# Patient Record
Sex: FEMALE | Race: WHITE | Employment: OTHER | URBAN - METROPOLITAN AREA
[De-identification: names, ages, dates, MRNs, and addresses within clinical notes are randomized per-mention and may not be internally consistent; named-entity substitution may affect disease eponyms.]

---

## 2019-07-31 ENCOUNTER — APPOINTMENT (OUTPATIENT)
Dept: GENERAL RADIOLOGY | Age: 44
End: 2019-07-31
Payer: COMMERCIAL

## 2019-07-31 ENCOUNTER — HOSPITAL ENCOUNTER (EMERGENCY)
Age: 44
Discharge: HOME | End: 2019-07-31
Attending: EMERGENCY MEDICINE
Payer: COMMERCIAL

## 2019-07-31 ENCOUNTER — APPOINTMENT (OUTPATIENT)
Dept: CT IMAGING | Age: 44
End: 2019-07-31
Payer: COMMERCIAL

## 2019-07-31 VITALS
SYSTOLIC BLOOD PRESSURE: 138 MMHG | TEMPERATURE: 97.5 F | HEART RATE: 121 BPM | DIASTOLIC BLOOD PRESSURE: 90 MMHG | OXYGEN SATURATION: 97 % | RESPIRATION RATE: 18 BRPM

## 2019-07-31 DIAGNOSIS — G89.29 CHRONIC NECK PAIN: ICD-10-CM

## 2019-07-31 DIAGNOSIS — J18.9 PNEUMONIA DUE TO ORGANISM: Primary | ICD-10-CM

## 2019-07-31 DIAGNOSIS — M54.2 CHRONIC NECK PAIN: ICD-10-CM

## 2019-07-31 LAB
ANION GAP SERPL CALCULATED.3IONS-SCNC: 14 MMOL/L (ref 3–16)
BACTERIA: ABNORMAL /HPF
BASOPHILS ABSOLUTE: 0.1 K/UL (ref 0–0.2)
BASOPHILS RELATIVE PERCENT: 0.7 %
BILIRUBIN URINE: NEGATIVE
BLOOD, URINE: ABNORMAL
BUN BLDV-MCNC: 6 MG/DL (ref 7–20)
CALCIUM SERPL-MCNC: 8.8 MG/DL (ref 8.3–10.6)
CHLORIDE BLD-SCNC: 99 MMOL/L (ref 99–110)
CLARITY: CLEAR
CO2: 19 MMOL/L (ref 21–32)
COLOR: YELLOW
CREAT SERPL-MCNC: 0.5 MG/DL (ref 0.6–1.1)
D DIMER: 472 NG/ML DDU (ref 0–229)
EKG ATRIAL RATE: 143 BPM
EKG DIAGNOSIS: NORMAL
EKG P AXIS: -7 DEGREES
EKG P-R INTERVAL: 88 MS
EKG Q-T INTERVAL: 364 MS
EKG QRS DURATION: 84 MS
EKG QTC CALCULATION (BAZETT): 561 MS
EKG R AXIS: -40 DEGREES
EKG T AXIS: 4 DEGREES
EKG VENTRICULAR RATE: 143 BPM
EOSINOPHILS ABSOLUTE: 0 K/UL (ref 0–0.6)
EOSINOPHILS RELATIVE PERCENT: 0.1 %
EPITHELIAL CELLS, UA: ABNORMAL /HPF
GFR AFRICAN AMERICAN: >60
GFR NON-AFRICAN AMERICAN: >60
GLUCOSE BLD-MCNC: 129 MG/DL (ref 70–99)
GLUCOSE URINE: NEGATIVE MG/DL
HCG(URINE) PREGNANCY TEST: NEGATIVE
HCT VFR BLD CALC: 39.1 % (ref 36–48)
HEMOGLOBIN: 13.1 G/DL (ref 12–16)
KETONES, URINE: NEGATIVE MG/DL
LEUKOCYTE ESTERASE, URINE: NEGATIVE
LYMPHOCYTES ABSOLUTE: 1.2 K/UL (ref 1–5.1)
LYMPHOCYTES RELATIVE PERCENT: 11.9 %
MCH RBC QN AUTO: 29.3 PG (ref 26–34)
MCHC RBC AUTO-ENTMCNC: 33.6 G/DL (ref 31–36)
MCV RBC AUTO: 87.4 FL (ref 80–100)
MICROSCOPIC EXAMINATION: YES
MONOCYTES ABSOLUTE: 0.8 K/UL (ref 0–1.3)
MONOCYTES RELATIVE PERCENT: 8 %
NEUTROPHILS ABSOLUTE: 7.7 K/UL (ref 1.7–7.7)
NEUTROPHILS RELATIVE PERCENT: 79.3 %
NITRITE, URINE: NEGATIVE
PDW BLD-RTO: 13 % (ref 12.4–15.4)
PH UA: 6 (ref 5–8)
PLATELET # BLD: 264 K/UL (ref 135–450)
PMV BLD AUTO: 9.4 FL (ref 5–10.5)
POTASSIUM REFLEX MAGNESIUM: 4.6 MMOL/L (ref 3.5–5.1)
PROTEIN UA: ABNORMAL MG/DL
RBC # BLD: 4.47 M/UL (ref 4–5.2)
RBC UA: ABNORMAL /HPF (ref 0–2)
SODIUM BLD-SCNC: 132 MMOL/L (ref 136–145)
SPECIFIC GRAVITY UA: 1.02 (ref 1–1.03)
URINE TYPE: ABNORMAL
UROBILINOGEN, URINE: 0.2 E.U./DL
WBC # BLD: 9.8 K/UL (ref 4–11)
WBC UA: ABNORMAL /HPF (ref 0–5)

## 2019-07-31 PROCEDURE — 71046 X-RAY EXAM CHEST 2 VIEWS: CPT

## 2019-07-31 PROCEDURE — 99285 EMERGENCY DEPT VISIT HI MDM: CPT

## 2019-07-31 PROCEDURE — 71275 CT ANGIOGRAPHY CHEST: CPT

## 2019-07-31 PROCEDURE — 85025 COMPLETE CBC W/AUTO DIFF WBC: CPT

## 2019-07-31 PROCEDURE — 85379 FIBRIN DEGRADATION QUANT: CPT

## 2019-07-31 PROCEDURE — 6360000004 HC RX CONTRAST MEDICATION: Performed by: EMERGENCY MEDICINE

## 2019-07-31 PROCEDURE — 96360 HYDRATION IV INFUSION INIT: CPT

## 2019-07-31 PROCEDURE — 84703 CHORIONIC GONADOTROPIN ASSAY: CPT

## 2019-07-31 PROCEDURE — 36415 COLL VENOUS BLD VENIPUNCTURE: CPT

## 2019-07-31 PROCEDURE — 81001 URINALYSIS AUTO W/SCOPE: CPT

## 2019-07-31 PROCEDURE — 2580000003 HC RX 258: Performed by: PHYSICIAN ASSISTANT

## 2019-07-31 PROCEDURE — 80048 BASIC METABOLIC PNL TOTAL CA: CPT

## 2019-07-31 PROCEDURE — 93005 ELECTROCARDIOGRAM TRACING: CPT | Performed by: EMERGENCY MEDICINE

## 2019-07-31 RX ORDER — LEVOFLOXACIN 750 MG/1
750 TABLET ORAL DAILY
Qty: 5 TABLET | Refills: 0 | Status: SHIPPED | OUTPATIENT
Start: 2019-07-31 | End: 2019-08-05

## 2019-07-31 RX ORDER — SODIUM CHLORIDE, SODIUM LACTATE, POTASSIUM CHLORIDE, CALCIUM CHLORIDE 600; 310; 30; 20 MG/100ML; MG/100ML; MG/100ML; MG/100ML
1000 INJECTION, SOLUTION INTRAVENOUS ONCE
Status: COMPLETED | OUTPATIENT
Start: 2019-07-31 | End: 2019-07-31

## 2019-07-31 RX ADMIN — SODIUM CHLORIDE, POTASSIUM CHLORIDE, SODIUM LACTATE AND CALCIUM CHLORIDE 1000 ML: 600; 310; 30; 20 INJECTION, SOLUTION INTRAVENOUS at 18:30

## 2019-07-31 RX ADMIN — IOPAMIDOL 80 ML: 755 INJECTION, SOLUTION INTRAVENOUS at 18:15

## 2019-07-31 ASSESSMENT — PAIN DESCRIPTION - PAIN TYPE: TYPE: ACUTE PAIN

## 2019-07-31 ASSESSMENT — PAIN SCALES - GENERAL: PAINLEVEL_OUTOF10: 7

## 2019-07-31 ASSESSMENT — PAIN DESCRIPTION - DESCRIPTORS: DESCRIPTORS: CONSTANT

## 2019-07-31 ASSESSMENT — PAIN DESCRIPTION - LOCATION: LOCATION: HEAD;NECK

## 2022-02-25 ENCOUNTER — HOSPITAL ENCOUNTER (EMERGENCY)
Age: 47
Discharge: HOME | End: 2022-02-25
Attending: STUDENT IN AN ORGANIZED HEALTH CARE EDUCATION/TRAINING PROGRAM
Payer: COMMERCIAL

## 2022-02-25 VITALS
TEMPERATURE: 98.9 F | RESPIRATION RATE: 18 BRPM | OXYGEN SATURATION: 99 % | HEIGHT: 64 IN | WEIGHT: 160 LBS | SYSTOLIC BLOOD PRESSURE: 134 MMHG | BODY MASS INDEX: 27.31 KG/M2 | HEART RATE: 89 BPM | DIASTOLIC BLOOD PRESSURE: 82 MMHG

## 2022-02-25 DIAGNOSIS — M54.2 CHRONIC NECK PAIN: Primary | ICD-10-CM

## 2022-02-25 DIAGNOSIS — G89.29 CHRONIC NECK PAIN: Primary | ICD-10-CM

## 2022-02-25 PROBLEM — T85.615A: Status: ACTIVE | Noted: 2020-11-19

## 2022-02-25 PROBLEM — N93.9 ABNORMAL UTERINE BLEEDING (AUB): Status: ACTIVE | Noted: 2017-04-06

## 2022-02-25 PROBLEM — E66.3 OVERWEIGHT: Status: ACTIVE | Noted: 2020-11-06

## 2022-02-25 PROCEDURE — 6360000002 HC RX W HCPCS: Performed by: PHYSICIAN ASSISTANT

## 2022-02-25 PROCEDURE — 6360000002 HC RX W HCPCS: Performed by: STUDENT IN AN ORGANIZED HEALTH CARE EDUCATION/TRAINING PROGRAM

## 2022-02-25 PROCEDURE — 6370000000 HC RX 637 (ALT 250 FOR IP): Performed by: PHYSICIAN ASSISTANT

## 2022-02-25 PROCEDURE — 96376 TX/PRO/DX INJ SAME DRUG ADON: CPT

## 2022-02-25 PROCEDURE — 96374 THER/PROPH/DIAG INJ IV PUSH: CPT

## 2022-02-25 PROCEDURE — 99284 EMERGENCY DEPT VISIT MOD MDM: CPT

## 2022-02-25 RX ORDER — CYCLOBENZAPRINE HCL 10 MG
10 TABLET ORAL ONCE
Status: COMPLETED | OUTPATIENT
Start: 2022-02-25 | End: 2022-02-25

## 2022-02-25 RX ORDER — CYCLOBENZAPRINE HCL 10 MG
TABLET ORAL
COMMUNITY
Start: 2021-12-07

## 2022-02-25 RX ORDER — OXYCODONE AND ACETAMINOPHEN 10; 325 MG/1; MG/1
1 TABLET ORAL EVERY 4 HOURS PRN
COMMUNITY

## 2022-02-25 RX ORDER — KETOROLAC TROMETHAMINE 30 MG/ML
15 INJECTION, SOLUTION INTRAMUSCULAR; INTRAVENOUS ONCE
Status: DISCONTINUED | OUTPATIENT
Start: 2022-02-25 | End: 2022-02-25

## 2022-02-25 RX ORDER — BISOPROLOL FUMARATE 10 MG/1
TABLET ORAL
COMMUNITY
Start: 2022-02-22

## 2022-02-25 RX ORDER — LIDOCAINE 4 G/G
1 PATCH TOPICAL DAILY
Status: DISCONTINUED | OUTPATIENT
Start: 2022-02-25 | End: 2022-02-25 | Stop reason: HOSPADM

## 2022-02-25 RX ORDER — OXYCODONE HYDROCHLORIDE AND ACETAMINOPHEN 5; 325 MG/1; MG/1
2 TABLET ORAL ONCE
Status: DISCONTINUED | OUTPATIENT
Start: 2022-02-25 | End: 2022-02-25

## 2022-02-25 RX ADMIN — CYCLOBENZAPRINE 10 MG: 10 TABLET, FILM COATED ORAL at 21:26

## 2022-02-25 RX ADMIN — HYDROMORPHONE HYDROCHLORIDE 1 MG: 1 INJECTION, SOLUTION INTRAMUSCULAR; INTRAVENOUS; SUBCUTANEOUS at 19:51

## 2022-02-25 RX ADMIN — HYDROMORPHONE HYDROCHLORIDE 1 MG: 1 INJECTION, SOLUTION INTRAMUSCULAR; INTRAVENOUS; SUBCUTANEOUS at 21:26

## 2022-02-25 ASSESSMENT — ENCOUNTER SYMPTOMS
SHORTNESS OF BREATH: 0
CHEST TIGHTNESS: 0
NAUSEA: 0
ABDOMINAL PAIN: 0
EYE PAIN: 0
BACK PAIN: 1
VOMITING: 0
WHEEZING: 0
DIARRHEA: 0
SINUS PAIN: 0
COUGH: 0
SORE THROAT: 0

## 2022-02-25 ASSESSMENT — PAIN - FUNCTIONAL ASSESSMENT: PAIN_FUNCTIONAL_ASSESSMENT: 0-10

## 2022-02-25 ASSESSMENT — PAIN DESCRIPTION - PAIN TYPE: TYPE: CHRONIC PAIN

## 2022-02-25 ASSESSMENT — PAIN SCALES - GENERAL
PAINLEVEL_OUTOF10: 10
PAINLEVEL_OUTOF10: 8

## 2022-02-25 ASSESSMENT — PAIN DESCRIPTION - LOCATION: LOCATION: NECK

## 2022-02-26 ENCOUNTER — HOSPITAL ENCOUNTER (EMERGENCY)
Age: 47
Discharge: HOME | End: 2022-02-27
Attending: STUDENT IN AN ORGANIZED HEALTH CARE EDUCATION/TRAINING PROGRAM
Payer: COMMERCIAL

## 2022-02-26 ENCOUNTER — APPOINTMENT (OUTPATIENT)
Dept: CT IMAGING | Age: 47
End: 2022-02-26
Payer: COMMERCIAL

## 2022-02-26 VITALS
WEIGHT: 166.3 LBS | RESPIRATION RATE: 16 BRPM | DIASTOLIC BLOOD PRESSURE: 88 MMHG | HEART RATE: 91 BPM | OXYGEN SATURATION: 95 % | SYSTOLIC BLOOD PRESSURE: 142 MMHG | TEMPERATURE: 98.4 F | BODY MASS INDEX: 28.39 KG/M2 | HEIGHT: 64 IN

## 2022-02-26 DIAGNOSIS — M54.32 LEFT SCIATIC NERVE PAIN: ICD-10-CM

## 2022-02-26 DIAGNOSIS — M54.6 PAIN IN THORACIC SPINE: ICD-10-CM

## 2022-02-26 DIAGNOSIS — R70.0 ELEVATED ERYTHROCYTE SEDIMENTATION RATE: ICD-10-CM

## 2022-02-26 DIAGNOSIS — R79.82 ELEVATED C-REACTIVE PROTEIN (CRP): ICD-10-CM

## 2022-02-26 DIAGNOSIS — G89.29 CHRONIC CERVICAL PAIN: Primary | ICD-10-CM

## 2022-02-26 DIAGNOSIS — M54.2 CHRONIC CERVICAL PAIN: Primary | ICD-10-CM

## 2022-02-26 LAB
ANION GAP SERPL CALCULATED.3IONS-SCNC: 10 MMOL/L (ref 3–16)
BASOPHILS ABSOLUTE: 0 K/UL (ref 0–0.2)
BASOPHILS RELATIVE PERCENT: 0.6 %
BUN BLDV-MCNC: 9 MG/DL (ref 7–20)
C-REACTIVE PROTEIN: 10.8 MG/L (ref 0–5.1)
CALCIUM SERPL-MCNC: 9.3 MG/DL (ref 8.3–10.6)
CHLORIDE BLD-SCNC: 106 MMOL/L (ref 99–110)
CO2: 24 MMOL/L (ref 21–32)
CREAT SERPL-MCNC: 0.7 MG/DL (ref 0.6–1.1)
EOSINOPHILS ABSOLUTE: 0 K/UL (ref 0–0.6)
EOSINOPHILS RELATIVE PERCENT: 0.4 %
GFR AFRICAN AMERICAN: >60
GFR NON-AFRICAN AMERICAN: >60
GLUCOSE BLD-MCNC: 125 MG/DL (ref 70–99)
HCT VFR BLD CALC: 37.6 % (ref 36–48)
HEMOGLOBIN: 12.8 G/DL (ref 12–16)
LYMPHOCYTES ABSOLUTE: 1.7 K/UL (ref 1–5.1)
LYMPHOCYTES RELATIVE PERCENT: 29.3 %
MCH RBC QN AUTO: 29.8 PG (ref 26–34)
MCHC RBC AUTO-ENTMCNC: 34.1 G/DL (ref 31–36)
MCV RBC AUTO: 87.5 FL (ref 80–100)
MONOCYTES ABSOLUTE: 0.5 K/UL (ref 0–1.3)
MONOCYTES RELATIVE PERCENT: 9 %
NEUTROPHILS ABSOLUTE: 3.6 K/UL (ref 1.7–7.7)
NEUTROPHILS RELATIVE PERCENT: 60.7 %
PDW BLD-RTO: 12.7 % (ref 12.4–15.4)
PLATELET # BLD: 309 K/UL (ref 135–450)
PMV BLD AUTO: 9.4 FL (ref 5–10.5)
POTASSIUM SERPL-SCNC: 4.2 MMOL/L (ref 3.5–5.1)
RBC # BLD: 4.3 M/UL (ref 4–5.2)
SEDIMENTATION RATE, ERYTHROCYTE: 32 MM/HR (ref 0–20)
SODIUM BLD-SCNC: 140 MMOL/L (ref 136–145)
WBC # BLD: 5.9 K/UL (ref 4–11)

## 2022-02-26 PROCEDURE — 6360000002 HC RX W HCPCS: Performed by: STUDENT IN AN ORGANIZED HEALTH CARE EDUCATION/TRAINING PROGRAM

## 2022-02-26 PROCEDURE — 96374 THER/PROPH/DIAG INJ IV PUSH: CPT

## 2022-02-26 PROCEDURE — 85652 RBC SED RATE AUTOMATED: CPT

## 2022-02-26 PROCEDURE — 85025 COMPLETE CBC W/AUTO DIFF WBC: CPT

## 2022-02-26 PROCEDURE — 36415 COLL VENOUS BLD VENIPUNCTURE: CPT

## 2022-02-26 PROCEDURE — 96375 TX/PRO/DX INJ NEW DRUG ADDON: CPT

## 2022-02-26 PROCEDURE — 72128 CT CHEST SPINE W/O DYE: CPT

## 2022-02-26 PROCEDURE — 86140 C-REACTIVE PROTEIN: CPT

## 2022-02-26 PROCEDURE — 72131 CT LUMBAR SPINE W/O DYE: CPT

## 2022-02-26 PROCEDURE — 96376 TX/PRO/DX INJ SAME DRUG ADON: CPT

## 2022-02-26 PROCEDURE — 72125 CT NECK SPINE W/O DYE: CPT

## 2022-02-26 PROCEDURE — 99283 EMERGENCY DEPT VISIT LOW MDM: CPT

## 2022-02-26 PROCEDURE — 80048 BASIC METABOLIC PNL TOTAL CA: CPT

## 2022-02-26 RX ADMIN — HYDROMORPHONE HYDROCHLORIDE 1 MG: 1 INJECTION, SOLUTION INTRAMUSCULAR; INTRAVENOUS; SUBCUTANEOUS at 23:47

## 2022-02-26 RX ADMIN — HYDROMORPHONE HYDROCHLORIDE 1 MG: 1 INJECTION, SOLUTION INTRAMUSCULAR; INTRAVENOUS; SUBCUTANEOUS at 21:05

## 2022-02-26 ASSESSMENT — PAIN SCALES - GENERAL
PAINLEVEL_OUTOF10: 9
PAINLEVEL_OUTOF10: 10
PAINLEVEL_OUTOF10: 8

## 2022-02-26 ASSESSMENT — PAIN DESCRIPTION - DESCRIPTORS: DESCRIPTORS: ACHING

## 2022-02-26 ASSESSMENT — PAIN DESCRIPTION - LOCATION: LOCATION: BACK;NECK

## 2022-02-26 ASSESSMENT — PAIN DESCRIPTION - PAIN TYPE: TYPE: CHRONIC PAIN;ACUTE PAIN

## 2022-02-26 ASSESSMENT — PAIN DESCRIPTION - FREQUENCY: FREQUENCY: CONTINUOUS

## 2022-02-26 ASSESSMENT — PAIN - FUNCTIONAL ASSESSMENT: PAIN_FUNCTIONAL_ASSESSMENT: 0-10

## 2022-02-26 NOTE — ED PROVIDER NOTES
[address] [facility] [address] ENCOUNTER          PHYSICIAN ASSISTANT NOTE       Date of evaluation: 2/25/2022    Chief Complaint     Neck Pain (states chronic and has dilaudid pump)    History of Present Illness     Alie Dumas is a 51 y.o. female who presents with a past medical history of degenerative disc disease and multiple back surgeries including cervical fusion who presents today with neck pain. Patient states that she has had chronic neck pain and currently has a Dilaudid pump as well as takes oral Percocet. Patient states that she has had this Dilaudid pump since November 2020. Over the past few weeks she has felt like her pump has not been working or controlling her pain as well as she used to. Last night, the patient had an episode where she was feeling very warm, sweaty and then had chills with reported subjective fevers and so she contacted her anesthesiologist who had the patient get some labs done this morning. Patient states that she knows that there is 7 cc left in her pain pump but cannot state exactly how much gets dispersed at what time. She has been in contact with both her anesthesiologist as well as the representative for the pain pump. Anesthesiologist sent the patient to the emergency department for control of her pain. They do have outpatient follow-up scheduled in the near future. Patient denies taking her temperature at home. She denies any chest pain, shortness of breath, nausea, vomiting, diarrhea, abdominal pain, numbness or tingling of the extremities, weakness. Patient denies any neck meningismus. Patient has not had a period in quite some time due to oral birth control pills and is not concerned for pregnancy. Denies any sick contacts. She is vaccinated for COVID-19. Review of Systems     Review of Systems   Constitutional: Positive for chills. HENT: Negative for sinus pain, sneezing and sore throat. Eyes: Negative for pain. Extraocular Movements: Extraocular movements intact. Pupils: Pupils are equal, round, and reactive to light. Cardiovascular:      Rate and Rhythm: Normal rate and regular rhythm. Pulmonary:      Effort: Pulmonary effort is normal.      Breath sounds: Normal breath sounds. Abdominal:      General: Abdomen is flat. Bowel sounds are normal. There is no distension. Palpations: Abdomen is soft. There is no mass. Tenderness: There is no abdominal tenderness. There is no guarding. Musculoskeletal:         General: Tenderness and signs of injury present. Normal range of motion. Cervical back: Normal range of motion and neck supple. No rigidity or tenderness. Comments: There is a scar located in the cervicothoracic region of the spine consistent with patient's previous surgical interventions. There is tenderness over this region. Patient has good range of motion of the spine. Good strength of the upper and lower extremities bilaterally. Good sensation of the upper and lower extremities bilaterally. Gait is steady. Lymphadenopathy:      Cervical: No cervical adenopathy. Skin:     General: Skin is warm and dry. Capillary Refill: Capillary refill takes less than 2 seconds. Neurological:      General: No focal deficit present. Mental Status: She is alert and oriented to person, place, and time. Mental status is at baseline. Psychiatric:         Mood and Affect: Mood normal.         Behavior: Behavior normal.         Thought Content: Thought content normal.         Judgment: Judgment normal.       Diagnostic Results     RADIOLOGY:  No orders to display     LABS:   No results found for this visit on 02/25/22. RECENT VITALS:  BP: (!) 144/96, Temp: 98.9 °F (37.2 °C), Pulse: 89, Resp: 18, SpO2: 100 %     Procedures     None    ED Course     Nursing Notes, Past Medical Hx,Past Surgical Hx, Social Hx, Allergies, and Family Hx were reviewed.     The patient was given the following medications:  Orders Placed This Encounter   Medications    HYDROmorphone (DILAUDID) injection 1 mg    cyclobenzaprine (FLEXERIL) tablet 10 mg    DISCONTD: oxyCODONE-acetaminophen (PERCOCET) 5-325 MG per tablet 2 tablet    ketorolac (TORADOL) injection 15 mg    lidocaine 4 % external patch 1 patch    HYDROmorphone (DILAUDID) injection 1 mg     CONSULTS:  None    MEDICAL DECISION MAKING / ASSESSMENT / Chioma Jacklyn is a 51 y.o. female who presents with acute on chronic neck pain with a history of degenerative disc disease status post multiple cervical fusions. On my exam the patient is very well-appearing she is afebrile, not tachycardic, tachypneic or hypoxic. She is normotensive. Patient initially given a dose of IV Dilaudid with some relief in her symptoms. She still complains of some restless legs and 8/10 pain. So at this point I decided to give the patient her home medications which is a dose of Percocet , Flexeril 10 mg and a dose of Toradol with Lidoderm patch for multimodal control of the patient's pain. The patient has no evidence of any meningismus, fevers. She had a normal white blood cell count of CBC this morning. My suspicion for meningitis is very low at this point. The patient denies urinary incontinence, urinary retention, and numbness in the extremities. The patient is neurologically intact, sensation is intact in the lower extremities, dorsalis pedal pulses 2+ bilaterally, and achilles reflex intact bilaterally. I do not feel imaging is necessary at this point based on pain is chronic in nature in the absence of trauma, progressive neurological findings, history of malignancy, age >/= 48years old, unexpected weight loss or fever, infectious risk such as injection drug use, immunosuppression, indwelling urinary catheter, prolonged steroid use, skin or urinary tract infection, osteoporosis. There is no NEW complaints.  There are no subjective complaints nor objective physical examination findings to suggest a referred vascular, genitourinary, intra-abdominal nor gastrointestinal etiology. Attempted to speak with the patient's anesthesiologist who monitors the patient and her pain pump to touch base with him and ask if there is anything he would like us to do on our end however he is currently on a flight. Had a discussion with the patient on gathering labs, imaging or swabs and the patient declined the need for this at this time. She states that she is here specifically for pain control to get her through the night so that she can have follow-up with her physicians in the next week. Patient was given her home medications and pain decreased to about a 7/10 which is according to the patient what she lives at on a daily basis. Therefore I feel that she is appropriate for discharge. Patient and her  are agreeable to this. She will follow up with her anesthesiologist/pain management doctor, Dr. Donte Berg early next week. Patient stated that she was ready to be discharged before I was able to contact the patient's doctor. I feel that this is reasonable as the patient can likely manage her discomfort at home with her oral Percocet, Dilaudid pump, Flexeril. She knows that she can return to the emergency department if her pain becomes uncontrollable again. This patient was also evaluated by the attending physician. All care plans were discussed and agreed upon. Clinical Impression     1.  Chronic neck pain      Disposition     PATIENT REFERRED TO:  Bhakti Cabral  [address] [place] [address] Rd  [phone]    Schedule an appointment as soon as possible for a visit   As needed    [facility] Emergency Department  [address] [place] [address]  Go to   As needed, If symptoms worsen      DISCHARGE MEDICATIONS:  New Prescriptions    No medications on file     DISPOSITION [address] [facility] [address]     [place], [place]  02/25/22 4202

## 2022-02-27 PROCEDURE — 99283 EMERGENCY DEPT VISIT LOW MDM: CPT

## 2022-02-27 PROCEDURE — 6360000002 HC RX W HCPCS: Performed by: STUDENT IN AN ORGANIZED HEALTH CARE EDUCATION/TRAINING PROGRAM

## 2022-02-27 RX ORDER — METHYLPREDNISOLONE 4 MG/1
TABLET ORAL
Qty: 1 KIT | Refills: 0 | Status: SHIPPED | OUTPATIENT
Start: 2022-02-27 | End: 2022-03-05

## 2022-02-27 RX ORDER — DEXAMETHASONE SODIUM PHOSPHATE 4 MG/ML
10 INJECTION, SOLUTION INTRA-ARTICULAR; INTRALESIONAL; INTRAMUSCULAR; INTRAVENOUS; SOFT TISSUE ONCE
Status: COMPLETED | OUTPATIENT
Start: 2022-02-27 | End: 2022-02-27

## 2022-02-27 RX ADMIN — DEXAMETHASONE SODIUM PHOSPHATE 10 MG: 4 INJECTION, SOLUTION INTRAMUSCULAR; INTRAVENOUS at 00:44

## 2022-02-27 NOTE — ED PROVIDER NOTES
[id number]          ATTENDING PHYSICIAN NOTE       Date of evaluation: 2/26/2022    Chief Complaint     Chief Complaint   Patient presents with    Neck Pain     increased neck pain since pain pump was adjusted on thursday          History of Present Illness     HPI     Kiran Ellis is a 51 y.o. female with a past medical history significant for degenerative disc disease of cervical and lumbar spine with multiple back surgeries including 3 different cervical fusions, lumbar fusion, who has had a implanted pain pump since 2020, with subsequent removal and replacement in late 2021, who presents to the ED today for repeat evaluation of recurrent acute on chronic exacerbation of her cervical pain, in addition to some thoracic pain today. Of note, I saw this patient yesterday evening/last night in this ED for acute on chronic cervical pain after having the patency of her pain pump catheter evaluated in her pain management physician's office the day before, which included just removal of her wound 1 to 2 cc of CSF and plain film imaging with fluoroscopy. When she presented to this ED yesterday evening she reported 10/10 cervical pain, with a reported baseline day to day \"normal\" pain of 7/10. She had been in discussion with her pain management doctor throughout the day yesterday regarding the severity of her pain and the concern for evaluating her for meningitis was brought it, leading her to present to our ED. of note, she had already had some blood work drawn earlier in the day yesterday, which included a CBC with a white blood cell count of around 6. When she presented to our ED then she was afebrile, not tachycardic, not diaphoretic, and appeared generally well, with full (relative) range of motion and no meningeal signs.  Through extended shared decision-making discussion at the bedside with her and her  we elected for just intermittent pain control with a goal of getting back to her baseline pain of 7/10, which we were able to do with IV Dilaudid, which was extensively discussed that this was only temporary and the additional Dilaudid that she received by us via IV would wear off. Note that her intrathecal pain pump supplies Dilaudid, and on top of that she has 10 mg Percocet that she can take every 4-6 hours. We had discussed contingency plans for the weekend, regarding using her pain pump for the intermittent rescue pain dosing that is available with the patient pressing and can be given every 4-6 hours I believe. We also discussed temporarily taking an extra dose of her Percocet or decreasing the time between doses just over the weekend until she can get back in with her pain management doctor on Monday. She notes that he is currently on an airplane headed out of town for the weekend, though they have been in constant communication throughout the day. After leaving the emergency department late last night, once home she took her 10 mg Flexeril, in addition to her prebedtime dose of oxycodone 10 mg. She states that she slept well actually, though woke up maybe twice during the night, which she says is fairly normal for her. She notes though that sometime around 5 or 6 AM this morning her pain was back up to an 8 or 9, and that when she went to give herself an extra \"rescue dose\" of the Dilaudid from her pain pump, she says that it showed that she was unable to have another dose for about 10 or 11 hours, given the amount that she had received within the last 24 hours. She says that she tried to tolerate the pain at home, but that it became just too much and led to her returning to the ED today. Again, she denies any chills, fever, nausea, vomiting, diarrhea. She has a good appetite she is urinating without difficulty without any visual abnormalities of the urine and without dysuria or hematuria.  She has been back in touch with her pain management doctor today as well, and ultimately decided to return to the ED. Here in the the ED she is reporting her pain is about a 9/10., Though she is also describing her pain a little different today than it was yesterday. She is now reporting that the pain is going down from her lower cervical spine to an area of greater tenderness along the mid back between her shoulder blades, then states that she has pain that goes down the left side of her spine, down the back of her left buttocks and all the way down the bottom of her left leg. She denies any falls since her visit last night, denies any heavy lifting. She denies any urinary or fecal incontinence and no numbness or tingling in her groin or in any extremity. Review of Systems     Review of Systems  All other ROS negative except as indicated above in HPI. Past Medical, Surgical, Family, and Social History     Past Medical History:   Diagnosis Date    Chronic cervical pain     3 prior cervical fusions    DDD (degenerative disc disease), cervical     DDD (degenerative disc disease), cervical     DDD (degenerative disc disease), lumbar     GERD (gastroesophageal reflux disease)     HTN (hypertension)        Past Surgical History:   Procedure Laterality Date    BACK SURGERY      CATHETER INSERTION  05/10/2020    Intrathecal pain pump & catheter insertion. Merlin Gavia, MD, done at [address]  x3 (2010, 2013, 2014)    PAIN MANAGEMENT PROCEDURE  11/19/2020    Spinal Pain Pump Replacement. Dr. Tj Johns        History reviewed. No pertinent family history. Social History     Tobacco Use    Smoking status: Former Smoker    Smokeless tobacco: Never Used   Substance Use Topics    Alcohol use: Yes     Comment: social    Drug use: Never          Medications     Prior to Admission medications    Medication Sig Start Date End Date Taking?  Authorizing Provider   methylPREDNISolone (MEDROL, ELIO,) 4 MG tablet Take by mouth. 2/27/22 3/5/22 Yes Elva Thornton MD   norgestrel-ethinyl estradiol (LO/OVRAL) 0.3-30 MG-MCG per tablet takse pills continuously 2/21/22  Yes Historical Provider, MD   bisoprolol (ZEBETA) 10 MG tablet 1 PO Q AM AND 2 PO Q PM  Indications: TACHYCARDIA 2/22/22  Yes Historical Provider, MD   cyclobenzaprine (FLEXERIL) 10 MG tablet TAKE 1 TABLET BY MOUTH 3 (THREE) TIMES DAILY AS NEEDED FOR UP TO 30 DAYS. 12/7/21  Yes Historical Provider, MD   oxyCODONE-acetaminophen (PERCOCET)  MG per tablet Take 1 tablet by mouth every 4 hours as needed. Yes Historical Provider, MD       Allergies     Allergies as of 02/26/2022 - Fully Reviewed 02/26/2022   Allergen Reaction Noted    Pcn [penicillins]  12/15/2014    Norco [hydrocodone-acetaminophen] Rash 07/31/2019    Phenergan [promethazine hcl] Palpitations 07/31/2019        Physical Exam     ED Triage Vitals [02/26/22 2000]   Enc Vitals Group      BP (!) 142/88      Pulse 91      Resp 16      Temp 98.4 °F (36.9 °C)      Temp Source Oral      SpO2 95 %      Weight 166 lb 4.8 oz (75.4 kg)      Height 5' 4\" (1.626 m)     Physical Exam  Vitals and nursing note reviewed. Constitutional:       Appearance: Normal appearance. She is normal weight. She is not ill-appearing. HENT:      Head: Normocephalic and atraumatic. Nose: Nose normal.      Mouth/Throat:      Mouth: Mucous membranes are moist.   Eyes:      Extraocular Movements: Extraocular movements intact. Neck:      Trachea: Trachea normal.   Cardiovascular:      Rate and Rhythm: Normal rate and regular rhythm. Pulses: Normal pulses. Pulmonary:      Effort: Pulmonary effort is normal.   Abdominal:      General: Abdomen is flat. Musculoskeletal:         General: No swelling. Cervical back: Normal range of motion and neck supple. No rigidity. Spinous process tenderness (mild at around C6-C7) and muscular tenderness (left paraspinal) present. No pain with movement.       Thoracic back: Tenderness and bony tenderness present. No swelling, deformity or spasms. Lumbar back: Tenderness present. No bony tenderness. Positive left straight leg raise test. Negative right straight leg raise test.        Back:       Comments: Mild TTP along midline of area around T4-T5 as well as left paraspinal TTP at this level and up to about C6 area, just left of midline     Skin:     General: Skin is warm and dry. Neurological:      Mental Status: She is alert and oriented to person, place, and time. MUSCULOSKELETAL EXAM      SPINE EXAMINATION  INSPECTION: Grossly normal lordosis in the cervical spine, kyphosis in the thoracic spine, lordosis in the lumbar spine. ALIGNMENT: Grossly normal skeletal alignment without scapula or rib cage prominence. PALPATION: As described above    SENSORY EXAMINATION:  C4 (acromioclavicular joint): Intact & symmetric bilat   C5 (lateral deltoid): Intact & symmetric bilat   C6 (thumb): Intact & symmetric bilat   C7 (middle finger): Intact & symmetric bilat   C8 (little finger): Intact & symmetric bilat   T1 (medial arm): Intact & symmetric bilat   L2 (mid anterior thigh): Intact & symmetric bilat   L3 (medial femoral condyle): Intact & symmetric bilat   L4 (medial lower leg & medial malleolus): Intact & symmetric bilat   L5 (lateral lower leg & dorsum of foot): Intact & symmetric bilat   S1 (lateral heel): Intact & symmetric bilat   S2 (medial popliteal fossa): Intact & symmetric bilat    MOTOR EXAMINATION:   C5 (elbowflexion - palm up) Left: 5/5. Right: 5/5  C6 (wrist extension) Left: 5/5. Right: 5/5  C7 (wrist flexion) Left: 5/5. Right: 5/5  C8 (finger flexion) Left: 5/5. Right: 5/5  T1 (hand intrinsics) Left: 5/5. Right: 5/5  L2,3 (hip flexors) Left: 5/5. Right: 5/5  L3,4 (knee extensors) Left: 5/5. Right: 5/5  L4,5 (ankle dorsiflexion) Left: 5/5. Right: 5/5  L5 (EHL) Left: 5/5. Right: 5/5  S1 (ankle plantarflexion): Left: 5/5.  Right: 5/5  S2 (FHL) Left 5/5 Right 5/5    Bilateral patellar reflexes symmetric and no clonus bilaterally    Diagnostic Results     RADIOLOGY:  CT LUMBAR SPINE WO CONTRAST   Final Result      1. Thoracic spine: No acute fracture or deformity. 2. Lumbar spine: Previous fusion at L5-S1 with cage placement. No sign of any spondylolisthesis or fracture. There is an epidural catheter entering underneath the transverse process at L3-4 and extending along the epidural space to T10 or T10-11 region               CT THORACIC SPINE WO CONTRAST   Final Result      1. Thoracic spine: No acute fracture or deformity. 2. Lumbar spine: Previous fusion at L5-S1 with cage placement. No sign of any spondylolisthesis or fracture. There is an epidural catheter entering underneath the transverse process at L3-4 and extending along the epidural space to T10 or T10-11 region               CT CERVICAL SPINE WO CONTRAST   Final Result      1. No acute fracture with mature bony fusion C4-C7   2.  Normal alignment          LABS:   Results for orders placed or performed during the hospital encounter of 02/26/22   Sedimentation Rate   Result Value Ref Range    Sed Rate 32 (H) 0 - 20 mm/Hr   C-Reactive Protein   Result Value Ref Range    CRP 10.8 (H) 0.0 - 5.1 mg/L   CBC with Auto Differential   Result Value Ref Range    WBC 5.9 4.0 - 11.0 K/uL    RBC 4.30 4.00 - 5.20 M/uL    Hemoglobin 12.8 12.0 - 16.0 g/dL    Hematocrit 37.6 36.0 - 48.0 %    MCV 87.5 80.0 - 100.0 fL    MCH 29.8 26.0 - 34.0 pg    MCHC 34.1 31.0 - 36.0 g/dL    RDW 12.7 12.4 - 15.4 %    Platelets 548 462 - 111 K/uL    MPV 9.4 5.0 - 10.5 fL    Neutrophils % 60.7 %    Lymphocytes % 29.3 %    Monocytes % 9.0 %    Eosinophils % 0.4 %    Basophils % 0.6 %    Neutrophils Absolute 3.6 1.7 - 7.7 K/uL    Lymphocytes Absolute 1.7 1.0 - 5.1 K/uL    Monocytes Absolute 0.5 0.0 - 1.3 K/uL    Eosinophils Absolute 0.0 0.0 - 0.6 K/uL    Basophils Absolute 0.0 0.0 - 0.2 K/uL   Basic Metabolic Panel   Result Value Ref Range    Sodium 140 136 - 145 mmol/L Potassium 4.2 3.5 - 5.1 mmol/L    Chloride 106 99 - 110 mmol/L    CO2 24 21 - 32 mmol/L    Anion Gap 10 3 - 16    Glucose 125 (H) 70 - 99 mg/dL    BUN 9 7 - 20 mg/dL    CREATININE 0.7 0.6 - 1.1 mg/dL    GFR Non-African American >60 >60    GFR African American >60 >60    Calcium 9.3 8.3 - 10.6 mg/dL       RECENT VITALS:  BP: (!) 142/88,Temp: 98.4 °F (36.9 °C), Pulse: 91, Resp: 16, SpO2: 95 %     Procedures       Procedures   None    ED Course     I have reviewed and confirmed nursing documentation for the pertinent past medical history, family history, and social history. ED Course as of 02/27/22 0957   Sat Feb 26, 2022 2107 Dr. Esvin Marshall  [phone] [DW]   Yovana Lamp Feb 27, 2022   0000 I spoke with Dr. Esvin Marshall, Anesthesiology with [facility], who is this patient's Pain Management Physician and who she has been going to for 7 years. We talked earlier in this visit about the potential etiologies of her ongoing pain and what modalities to order, etc.   We discussed the CT findings and her inflammatory markers.   [DW]   7923 CT THORACIC SPINE WO CONTRAST [DW]      ED Course User Index  [DW] Rakan Danielle MD       [facility] medications administered in the ED:  ED Medication Orders (From admission, onward)    Start Ordered     Status Ordering Provider    02/27/22 0030 02/27/22 0029  dexamethasone (DECADRON) injection 10 mg  ONCE         Last MAR action: Given - by Tiburcio Jacmoe on 02/27/22 at 0044 [facility] A    02/26/22 2345 02/26/22 2331  HYDROmorphone (DILAUDID) injection 1 mg  ONCE         Last MAR action: Given - by Tiburcio Jacome on 02/26/22 at 2347 [facility]4 CRYSTAL Lebron     02/26/22 2100 02/26/22 2052  HYDROmorphone (DILAUDID) injection 1 mg  ONCE         Last MAR action: Given - by Tiburcio Jacome on 02/26/22 at 2105 [facility] A           CONSULTS:  Yared Newell MD, Anesthesiologypain management - [place] (called directly to Dr. Tereso Rinne cell phone)    MEDICAL DECISIONMAKING / Myesha Weinstein / Ana Maria Hollins Hetal Richardson is a 51 y.o. female with PMHx as above, who presented to the ED today for acute on chronic cervicalgia with addition of thoracic pain in between her shoulder blades and lumbosacral radiculopathy with sciatica. See my HPI above for details of this patient's history and her visit last night. On my initial assessment of her today, that time her pain was 8-9/10, and we discussed in detail how her symptoms are today, reviewed her clinical course after leaving the ED last night, and the pain today. I discussed with her that given this is her second visit in 2 days, and that based on her examination yesterday, already having lab work before coming, and having imaging confirming the patency of at least the tubing of her catheter and the location of the catheter tip, without any infectious symptoms, that there was not a clinical indication to assess further labs or imaging yesterday. Given the return of her pain to a level that is notably above her baseline, along with the addition of thoracic pain and lumbosacral radiculopathy (note she has had pain in these areas before, but not being present yesterday and the return of her pain today back to a level significant enough for her to return to the ED) I called Dr. Hailee Muñoz while at the bedside and we all discussed her pain over these last few days, and this being abnormal for her, we will go ahead and repeat her blood work and obtain CT imaging of the C/T and L spine. While awaiting the results of her lab work and results of her imaging today, she did require 1 additional dose of IV Dilaudid. On reassessment following all of her results coming back, her pain was down to a 7/10 which is her baseline. We went over the results of her blood work, with the CBC and BMP being entirely unremarkable.   Most noteworthy then is that the CT noncontrast imaging of her cervical, thoracic and lumbar spine do not show any evidence of acute fracture, deformity or other acute pathological abnormality. I have reviewed the images and interpretation by reading radiologist. Interestingly, her ESR and CRP were elevated, which I discussed extensively at the bedside with the patient, her , and again we had Dr. Samara Ontiveros on the phone (he has obviously extended himself to wholly be there for Mrs. Mague Mosqueda; he was actually on vacation at the time, out of town and it was late at night) where we went through the collection of her evaluation yesterday, today, 2 sets of normal blood counts, and unremarkable imaging. We also discussed possibilities for her elevated ESR and CRP, which I cannot entirely pinpoint, though we all discussed the very low likelihood of meningitis, spinal abscess, epidural abscess or other abnormal fluid collection in the region but her intrathecal catheter traverses, and we do not see any gross abnormality of such or indicator of such on the plain film CT scans that would necessitate us to repeat the scans with contrast.  Additionally, she did have meningitis or a bacterial fluid collection I would expect her to have some other indicators of acute infectious etiology, such as fever, chills, leukocytosis, meningeal signs, and she has none of these, additionally she has an entirely symmetric and normal neurologic examination and both sensation and motor function. Dr. Samara Ontiveros indicated that the only thing he can think of/suspect given the totality of everything is a problem with the pump. He advised that if she is still requiring more pain meds and having unrelenting pain despite this, we can offer admission with plan for Neurosurgery consult for a pump removal and replacement, inpatient. I discussed this with Mrs. Mague Mosqueda and her , and ultimately it was decided that since her pain is back to 'normal' at this time, that she would be most comfortable at home, sleeping in her bed, which I find entirely understandable and reasonable.   They were advised that should the pain again returned to the level that it was and is intractable to her pain meds that she is able to take at home, that they can always return and that if she does need to return today, the recommendation for the team taking care of her should be to contact and notify Dr. Debbie Poole, and admit with Neurosurgery consult for the above plan. Lastly, given the presence of the elevated inflammatory markers (ESR & CRP) along with her as of yet not clearly identified source of increased pain, I believe a trial of a steroid dosepak is entirely reasonable. She is in agreement with this and therefore at this time I feel she is ok for discharge to home with a clear plan for next steps moving forward. Dr. Debbie Poole will also be back in town Monday and will be able to see her in his clinic/office. She was stable at time of discharge and ambulated in the department independently and without difficulty. I have discussed my assessment and plan with the patient/family, and all questions have been answered to the best of my ability, and to their satisfaction. The patient/family were understanding of this information, and were in agreement with today's assessment and plan. Clinical Impression     1. Chronic cervical pain    2. Pain in thoracic spine    3. Left sciatic nerve pain    4. Elevated erythrocyte sedimentation rate    5. Elevated C-reactive protein (CRP)        Disposition     DISPOSITION:   Decision To Discharge 02/27/2022 12:45:37 AM      PATIENT REFERRED TO:  [address] [place] [address] Rd  [phone]    Call   For Next available ED follow up visit. [address] [place] [address] [address] Derek Moritz 723 [phone]    Call in 1 day  For Next available ED follow up visit.       DISCHARGE MEDICATIONS:  Discharge Medication List as of 2/27/2022 12:36 AM      START taking these medications    Details   methylPREDNISolone (MEDROL, ELIO,) 4 MG tablet Take by mouth., Disp-1 kit, R-0Normal         CONTINUE these medications which have NOT CHANGED    Details   norgestrel-ethinyl estradiol (LO/OVRAL) 0.3-30 MG-MCG per tablet takse pills continuouslyHistorical Med      bisoprolol (ZEBETA) 10 MG tablet 1 PO Q AM AND 2 PO Q PM  Indications: TACHYCARDIAHistorical Med      cyclobenzaprine (FLEXERIL) 10 MG tablet TAKE 1 TABLET BY MOUTH 3 (THREE) TIMES DAILY AS NEEDED FOR UP TO 30 DAYS. Historical Med      oxyCODONE-acetaminophen (PERCOCET)  MG per tablet Take 1 tablet by mouth every 4 hours as needed. Historical Med            Don Davis MD  Emergency Medicine  [facility]     Sarkis Castro MD  02/27/22 2170

## 2022-02-28 ENCOUNTER — HOSPITAL ENCOUNTER (OUTPATIENT)
Age: 47
Setting detail: OBSERVATION
Discharge: HOME | End: 2022-03-02
Attending: EMERGENCY MEDICINE | Admitting: INTERNAL MEDICINE
Payer: COMMERCIAL

## 2022-02-28 DIAGNOSIS — M54.2 NECK PAIN: Primary | ICD-10-CM

## 2022-02-28 DIAGNOSIS — M96.0 PSEUDARTHROSIS AFTER FUSION OR ARTHRODESIS: ICD-10-CM

## 2022-02-28 PROCEDURE — G0378 HOSPITAL OBSERVATION PER HR: HCPCS

## 2022-02-28 PROCEDURE — 99284 EMERGENCY DEPT VISIT MOD MDM: CPT

## 2022-02-28 PROCEDURE — 6370000000 HC RX 637 (ALT 250 FOR IP): Performed by: INTERNAL MEDICINE

## 2022-02-28 PROCEDURE — 96374 THER/PROPH/DIAG INJ IV PUSH: CPT

## 2022-02-28 PROCEDURE — 6360000002 HC RX W HCPCS: Performed by: STUDENT IN AN ORGANIZED HEALTH CARE EDUCATION/TRAINING PROGRAM

## 2022-02-28 PROCEDURE — P9612 CATHETERIZE FOR URINE SPEC: HCPCS

## 2022-02-28 RX ORDER — PROMETHAZINE HYDROCHLORIDE 25 MG/1
12.5 TABLET ORAL EVERY 6 HOURS PRN
Status: DISCONTINUED | OUTPATIENT
Start: 2022-02-28 | End: 2022-03-02 | Stop reason: HOSPADM

## 2022-02-28 RX ORDER — OXYCODONE HYDROCHLORIDE 5 MG/1
5 TABLET ORAL EVERY 4 HOURS PRN
Status: DISCONTINUED | OUTPATIENT
Start: 2022-02-28 | End: 2022-03-01

## 2022-02-28 RX ORDER — ACETAMINOPHEN 325 MG/1
650 TABLET ORAL EVERY 6 HOURS PRN
Status: DISCONTINUED | OUTPATIENT
Start: 2022-02-28 | End: 2022-03-02 | Stop reason: HOSPADM

## 2022-02-28 RX ORDER — OXYCODONE HYDROCHLORIDE 5 MG/1
10 TABLET ORAL EVERY 4 HOURS PRN
Status: COMPLETED | OUTPATIENT
Start: 2022-02-28 | End: 2022-03-01

## 2022-02-28 RX ORDER — POTASSIUM CHLORIDE 7.45 MG/ML
10 INJECTION INTRAVENOUS PRN
Status: DISCONTINUED | OUTPATIENT
Start: 2022-02-28 | End: 2022-03-02 | Stop reason: HOSPADM

## 2022-02-28 RX ORDER — SODIUM CHLORIDE 9 MG/ML
25 INJECTION, SOLUTION INTRAVENOUS PRN
Status: DISCONTINUED | OUTPATIENT
Start: 2022-02-28 | End: 2022-03-02 | Stop reason: HOSPADM

## 2022-02-28 RX ORDER — SODIUM CHLORIDE 0.9 % (FLUSH) 0.9 %
10 SYRINGE (ML) INJECTION PRN
Status: DISCONTINUED | OUTPATIENT
Start: 2022-02-28 | End: 2022-03-02 | Stop reason: HOSPADM

## 2022-02-28 RX ORDER — MAGNESIUM SULFATE IN WATER 40 MG/ML
2000 INJECTION, SOLUTION INTRAVENOUS PRN
Status: DISCONTINUED | OUTPATIENT
Start: 2022-02-28 | End: 2022-03-02 | Stop reason: HOSPADM

## 2022-02-28 RX ORDER — SODIUM CHLORIDE 0.9 % (FLUSH) 0.9 %
10 SYRINGE (ML) INJECTION EVERY 12 HOURS SCHEDULED
Status: DISCONTINUED | OUTPATIENT
Start: 2022-02-28 | End: 2022-03-02 | Stop reason: HOSPADM

## 2022-02-28 RX ORDER — ACETAMINOPHEN 650 MG/1
650 SUPPOSITORY RECTAL EVERY 6 HOURS PRN
Status: DISCONTINUED | OUTPATIENT
Start: 2022-02-28 | End: 2022-03-02 | Stop reason: HOSPADM

## 2022-02-28 RX ORDER — ONDANSETRON 2 MG/ML
4 INJECTION INTRAMUSCULAR; INTRAVENOUS EVERY 6 HOURS PRN
Status: DISCONTINUED | OUTPATIENT
Start: 2022-02-28 | End: 2022-03-02 | Stop reason: HOSPADM

## 2022-02-28 RX ADMIN — HYDROMORPHONE HYDROCHLORIDE 1 MG: 1 INJECTION, SOLUTION INTRAMUSCULAR; INTRAVENOUS; SUBCUTANEOUS at 20:00

## 2022-02-28 RX ADMIN — OXYCODONE 10 MG: 5 TABLET ORAL at 23:50

## 2022-02-28 ASSESSMENT — ENCOUNTER SYMPTOMS
ABDOMINAL PAIN: 0
SHORTNESS OF BREATH: 0
NAUSEA: 0
COUGH: 0
BACK PAIN: 0
DIARRHEA: 0
SORE THROAT: 0
CONSTIPATION: 0
ABDOMINAL DISTENTION: 0
VOMITING: 0
CHEST TIGHTNESS: 0

## 2022-02-28 ASSESSMENT — PAIN SCALES - GENERAL
PAINLEVEL_OUTOF10: 9

## 2022-02-28 ASSESSMENT — PAIN DESCRIPTION - DESCRIPTORS
DESCRIPTORS: SHARP;ACHING
DESCRIPTORS: SHARP

## 2022-02-28 ASSESSMENT — PAIN DESCRIPTION - ONSET
ONSET: GRADUAL
ONSET: GRADUAL

## 2022-02-28 ASSESSMENT — PAIN DESCRIPTION - PROGRESSION
CLINICAL_PROGRESSION: GRADUALLY WORSENING
CLINICAL_PROGRESSION: GRADUALLY WORSENING

## 2022-02-28 ASSESSMENT — PAIN DESCRIPTION - LOCATION
LOCATION: NECK
LOCATION: NECK

## 2022-02-28 ASSESSMENT — PAIN DESCRIPTION - FREQUENCY
FREQUENCY: CONTINUOUS
FREQUENCY: CONTINUOUS

## 2022-02-28 ASSESSMENT — PAIN - FUNCTIONAL ASSESSMENT: PAIN_FUNCTIONAL_ASSESSMENT: 0-10

## 2022-02-28 ASSESSMENT — PAIN DESCRIPTION - PAIN TYPE
TYPE: CHRONIC PAIN
TYPE: ACUTE PAIN;CHRONIC PAIN

## 2022-02-28 NOTE — ED PROVIDER NOTES
[address] [facility] RESIDENT NOTE       Date of evaluation: 2/28/2022    Chief Complaint     Neck Pain (has pain pump in place is not working correctly so having pain in neck here Fri and Sat for same)      of Present Illness     Tito Mcmillan is a 51 y.o. female,  who presents 51 y.o. female with a past medical history significant for degenerative disc disease of cervical and lumbar spine with multiple back surgeries including 3 different cervical fusions (C4-5 level and previous discectomy and bone fusion C5-6 and C6-7 levels.) lumbar fusion, who has had a implanted pain pump since 2020, with subsequent removal and replacement in late 2021, who presents to the ED today for repeat evaluation of recurrent acute on chronic exacerbation of her cervical pain, in addition to some thoracic pain today. Patient has been evaluated for the symptoms twice in the last week. In summary, this top of her discomfort is secondary to a malfunctioning pain pump, and she is required spot dosing of Dilaudid for pain control. On her last visit, on 226/22, 2 days ago, the patient underwent CT imaging of the cervical, thoracic and lumbar spine which showed no acute abnormalities. At that time, the patient was offered admission to be evaluated by neurosurgery for potential replacement of the pain from, however the patient's pain was well managed with the spot doses of Dilaudid in the emergency department and the patient was ultimately discharged home on a Medrol Dosepak. Patient was noted to have mildly elevated ESR of 32 and CRP of 10.8, however with normal white count and lack of other infectious symptoms, meningitis or other infectious etiologies were thought to be less likely.     The patient states that she has continued to have persistent discomfort, however states that the symptoms have not changed in character, prompting her presentation to the emergency department, requesting to be evaluated by neurosurgery for potential pain pump replacement. Currently, she rates her discomfort as 10/10 in severity, with no modifying factors. She describes her discomfort as \"sharp\", in the middle of her lower neck/upper back. She denies any trauma, upper extremity numbness or weakness, headache, vision changes, fever, nausea or vomiting. She has otherwise been in her normal state of health. Review of Systems   A complete review of systems was conducted on this patient and was negative except as noted in the HPI. Review of Systems   Constitutional: Negative for chills and fever. HENT: Negative for sore throat. Respiratory: Negative for cough, chest tightness and shortness of breath. Cardiovascular: Negative for chest pain, palpitations and leg swelling. Gastrointestinal: Negative for abdominal distention, abdominal pain, constipation, diarrhea, nausea and vomiting. Genitourinary: Negative for dysuria, frequency, hematuria and urgency. Musculoskeletal: Positive for neck pain. Negative for back pain, myalgias and neck stiffness. Skin: Negative for rash. Neurological: Negative for weakness, light-headedness and headaches. Hematological: Negative for adenopathy. Psychiatric/Behavioral: The patient is not nervous/anxious. Past Medical, Surgical, Family, and Social History     She has a past medical history of Chronic cervical pain, DDD (degenerative disc disease), cervical, DDD (degenerative disc disease), cervical, DDD (degenerative disc disease), lumbar, GERD (gastroesophageal reflux disease), and HTN (hypertension). She has a past surgical history that includes back surgery; cervical fusion (x3 (2010, 2013, 2014)); Pain management procedure (11/19/2020); and Catheter Insertion (05/10/2020). Her family history is not on file. She reports that she has quit smoking. She has never used smokeless tobacco. She reports current alcohol use.  She reports that she does not use drugs. Medications     Previous Medications    BISOPROLOL (ZEBETA) 10 MG TABLET    1 PO Q AM AND 2 PO Q PM  Indications: TACHYCARDIA    CYCLOBENZAPRINE (FLEXERIL) 10 MG TABLET    TAKE 1 TABLET BY MOUTH 3 (THREE) TIMES DAILY AS NEEDED FOR UP TO 30 DAYS. METHYLPREDNISOLONE (MEDROL, ELIO,) 4 MG TABLET    Take by mouth. NORGESTREL-ETHINYL ESTRADIOL (LO/OVRAL) 0.3-30 MG-MCG PER TABLET    takse pills continuously    OXYCODONE-ACETAMINOPHEN (PERCOCET)  MG PER TABLET    Take 1 tablet by mouth every 4 hours as needed. Allergies     She is allergic to hydrocodone-acetaminophen, penicillins, and phenergan [promethazine hcl]. Physical Exam     INITIAL VITALS: BP: 134/67, Temp: 98 °F (36.7 °C), Pulse: 75, Resp: 16, SpO2: 98 %   Physical Exam  Vitals and nursing note reviewed. Constitutional:       General: She is not in acute distress. Appearance: Normal appearance. She is normal weight. She is not ill-appearing or toxic-appearing. HENT:      Head: Normocephalic and atraumatic. Mouth/Throat:      Mouth: Mucous membranes are moist.   Eyes:      Extraocular Movements: Extraocular movements intact. Conjunctiva/sclera: Conjunctivae normal.   Cardiovascular:      Rate and Rhythm: Normal rate and regular rhythm. Pulses: Normal pulses. Heart sounds: Normal heart sounds. Pulmonary:      Effort: Pulmonary effort is normal.      Breath sounds: Normal breath sounds. Abdominal:      General: There is no distension. Palpations: Abdomen is soft. Tenderness: There is no abdominal tenderness. There is no guarding or rebound. Musculoskeletal:      Cervical back: Neck supple. Tenderness present. No swelling, deformity, rigidity or bony tenderness. Crepitus: diffuse. Decreased range of motion. Thoracic back: Normal. No bony tenderness. Lumbar back: Normal.   Skin:     General: Skin is warm and dry. Capillary Refill: Capillary refill takes less than 2 seconds. Findings: No rash. Neurological:      General: No focal deficit present. Mental Status: She is alert and oriented to person, place, and time. Psychiatric:         Thought Content: Thought content normal.         Judgment: Judgment normal.       DiagnosticResults     RADIOLOGY:  No orders to display       LABS:   No results found for this visit on 02/28/22. RECENT VITALS:  BP: 134/67, Temp: 98 °F (36.7 °C), Pulse: 75,Resp: 16, SpO2: 98 %     ED Course     Nursing Notes, Past Medical Hx, Past Surgical Hx, Social Hx, Allergies, and Family Hx were reviewed. The patient was given the followingmedications:  Orders Placed This Encounter   Medications    HYDROmorphone (DILAUDID) injection 1 mg       CONSULTS:  IP CONSULT TO NEUROSURGERY  IP CONSULT TO HOSPITALIST    MEDICAL DECISION MAKING / ASSESSMENT / Heavenly Loma is a 51 y.o. female who presents 51 y.o. female with a past medical history significant for degenerative disc disease of cervical and lumbar spine with multiple back surgeries including 3 different cervical fusions (C4-5 level and previous discectomy and bone fusion C5-6 and C6-7 levels.) lumbar fusion, who has had a implanted pain pump since 2020, with subsequent removal and replacement in late 2021, who presents to the ED today for repeat evaluation of recurrent acute on chronic exacerbation of her cervical pain, in addition to some thoracic pain today. Given that the patient's symptoms are similar to what she has had in the past, without any new or worsening symptoms or abnormal neurologic exam findings, I do not feel that there is any indication to repeat any labs or perform additional imaging. I discussed the case with Dr. Stewart Freedman, the patient's orthopedic spine surgery, who recommended the patient be admitted for pain control and evaluation by neurosurgery for revision of her epidural pain pump.     The patient was given 1 mg of IV Dilaudid with improvement of her discomfort from 10/10 to 7/10 in severity. I discussed the case with neurosurgery, who agreed to evaluate the patient tomorrow. The patient was admitted to the hospitalist for further pain management overnight. This patient was also evaluated by the attending physician. All care plans were discussed and agreed upon. Clinical Impression     1. Neck pain        Disposition     PATIENT REFERRED TO:  No follow-up provider specified.     DISCHARGE MEDICATIONS:  New Prescriptions    No medications on file       DISPOSITION Decision To Admit 02/28/2022 10:11:30 PM       Julio César Roque MD  Resident  02/28/22 9021

## 2022-03-01 LAB
A/G RATIO: 1.5 (ref 1.1–2.2)
ALBUMIN SERPL-MCNC: 3.8 G/DL (ref 3.4–5)
ALP BLD-CCNC: 45 U/L (ref 40–129)
ALT SERPL-CCNC: 27 U/L (ref 10–40)
ANION GAP SERPL CALCULATED.3IONS-SCNC: 8 MMOL/L (ref 3–16)
AST SERPL-CCNC: 20 U/L (ref 15–37)
BASOPHILS ABSOLUTE: 0 K/UL (ref 0–0.2)
BASOPHILS RELATIVE PERCENT: 0.5 %
BILIRUB SERPL-MCNC: 0.6 MG/DL (ref 0–1)
BUN BLDV-MCNC: 16 MG/DL (ref 7–20)
C-REACTIVE PROTEIN: 5.4 MG/L (ref 0–5.1)
CALCIUM SERPL-MCNC: 8.9 MG/DL (ref 8.3–10.6)
CHLORIDE BLD-SCNC: 105 MMOL/L (ref 99–110)
CO2: 26 MMOL/L (ref 21–32)
CREAT SERPL-MCNC: 0.7 MG/DL (ref 0.6–1.1)
EOSINOPHILS ABSOLUTE: 0 K/UL (ref 0–0.6)
EOSINOPHILS RELATIVE PERCENT: 0.2 %
GFR AFRICAN AMERICAN: >60
GFR NON-AFRICAN AMERICAN: >60
GLUCOSE BLD-MCNC: 81 MG/DL (ref 70–99)
HCT VFR BLD CALC: 35.4 % (ref 36–48)
HEMOGLOBIN: 12 G/DL (ref 12–16)
LYMPHOCYTES ABSOLUTE: 3.3 K/UL (ref 1–5.1)
LYMPHOCYTES RELATIVE PERCENT: 44.7 %
MCH RBC QN AUTO: 29.4 PG (ref 26–34)
MCHC RBC AUTO-ENTMCNC: 33.8 G/DL (ref 31–36)
MCV RBC AUTO: 87 FL (ref 80–100)
MONOCYTES ABSOLUTE: 0.6 K/UL (ref 0–1.3)
MONOCYTES RELATIVE PERCENT: 8.8 %
NEUTROPHILS ABSOLUTE: 3.4 K/UL (ref 1.7–7.7)
NEUTROPHILS RELATIVE PERCENT: 45.8 %
PDW BLD-RTO: 12.7 % (ref 12.4–15.4)
PLATELET # BLD: 272 K/UL (ref 135–450)
PMV BLD AUTO: 9.6 FL (ref 5–10.5)
POTASSIUM REFLEX MAGNESIUM: 4.1 MMOL/L (ref 3.5–5.1)
RBC # BLD: 4.06 M/UL (ref 4–5.2)
SEDIMENTATION RATE, ERYTHROCYTE: 13 MM/HR (ref 0–20)
SODIUM BLD-SCNC: 139 MMOL/L (ref 136–145)
TOTAL PROTEIN: 6.3 G/DL (ref 6.4–8.2)
WBC # BLD: 7.4 K/UL (ref 4–11)

## 2022-03-01 PROCEDURE — 96365 THER/PROPH/DIAG IV INF INIT: CPT

## 2022-03-01 PROCEDURE — 96375 TX/PRO/DX INJ NEW DRUG ADDON: CPT

## 2022-03-01 PROCEDURE — 86140 C-REACTIVE PROTEIN: CPT

## 2022-03-01 PROCEDURE — 2580000003 HC RX 258: Performed by: NURSE PRACTITIONER

## 2022-03-01 PROCEDURE — 2580000003 HC RX 258: Performed by: INTERNAL MEDICINE

## 2022-03-01 PROCEDURE — 96372 THER/PROPH/DIAG INJ SC/IM: CPT

## 2022-03-01 PROCEDURE — 6370000000 HC RX 637 (ALT 250 FOR IP): Performed by: INTERNAL MEDICINE

## 2022-03-01 PROCEDURE — 6360000002 HC RX W HCPCS: Performed by: INTERNAL MEDICINE

## 2022-03-01 PROCEDURE — 6360000002 HC RX W HCPCS: Performed by: NURSE PRACTITIONER

## 2022-03-01 PROCEDURE — 6370000000 HC RX 637 (ALT 250 FOR IP): Performed by: NURSE PRACTITIONER

## 2022-03-01 PROCEDURE — 96366 THER/PROPH/DIAG IV INF ADDON: CPT

## 2022-03-01 PROCEDURE — 85025 COMPLETE CBC W/AUTO DIFF WBC: CPT

## 2022-03-01 PROCEDURE — 80053 COMPREHEN METABOLIC PANEL: CPT

## 2022-03-01 PROCEDURE — G0378 HOSPITAL OBSERVATION PER HR: HCPCS

## 2022-03-01 PROCEDURE — 85652 RBC SED RATE AUTOMATED: CPT

## 2022-03-01 PROCEDURE — 36415 COLL VENOUS BLD VENIPUNCTURE: CPT

## 2022-03-01 RX ORDER — KETOROLAC TROMETHAMINE 30 MG/ML
30 INJECTION, SOLUTION INTRAMUSCULAR; INTRAVENOUS ONCE
Status: COMPLETED | OUTPATIENT
Start: 2022-03-01 | End: 2022-03-01

## 2022-03-01 RX ORDER — METOPROLOL TARTRATE 50 MG/1
50 TABLET, FILM COATED ORAL 2 TIMES DAILY
Status: DISCONTINUED | OUTPATIENT
Start: 2022-03-01 | End: 2022-03-02 | Stop reason: HOSPADM

## 2022-03-01 RX ORDER — DIAZEPAM 5 MG/1
5 TABLET ORAL EVERY 6 HOURS PRN
Status: DISCONTINUED | OUTPATIENT
Start: 2022-03-01 | End: 2022-03-02 | Stop reason: HOSPADM

## 2022-03-01 RX ORDER — OXYCODONE HYDROCHLORIDE AND ACETAMINOPHEN 5; 325 MG/1; MG/1
2 TABLET ORAL EVERY 4 HOURS PRN
Status: DISCONTINUED | OUTPATIENT
Start: 2022-03-01 | End: 2022-03-02 | Stop reason: HOSPADM

## 2022-03-01 RX ORDER — OXYCODONE HYDROCHLORIDE AND ACETAMINOPHEN 5; 325 MG/1; MG/1
1 TABLET ORAL EVERY 4 HOURS PRN
Status: DISCONTINUED | OUTPATIENT
Start: 2022-03-01 | End: 2022-03-02 | Stop reason: HOSPADM

## 2022-03-01 RX ADMIN — METHOCARBAMOL 1000 MG: 100 INJECTION, SOLUTION INTRAMUSCULAR; INTRAVENOUS at 14:50

## 2022-03-01 RX ADMIN — KETOROLAC TROMETHAMINE 30 MG: 30 INJECTION, SOLUTION INTRAMUSCULAR at 23:03

## 2022-03-01 RX ADMIN — SODIUM CHLORIDE, PRESERVATIVE FREE 10 ML: 5 INJECTION INTRAVENOUS at 00:02

## 2022-03-01 RX ADMIN — METHOCARBAMOL 1000 MG: 100 INJECTION, SOLUTION INTRAMUSCULAR; INTRAVENOUS at 23:04

## 2022-03-01 RX ADMIN — METOPROLOL TARTRATE 50 MG: 50 TABLET, FILM COATED ORAL at 20:33

## 2022-03-01 RX ADMIN — OXYCODONE HYDROCHLORIDE AND ACETAMINOPHEN 2 TABLET: 5; 325 TABLET ORAL at 12:15

## 2022-03-01 RX ADMIN — OXYCODONE 10 MG: 5 TABLET ORAL at 04:41

## 2022-03-01 RX ADMIN — ENOXAPARIN SODIUM 40 MG: 100 INJECTION SUBCUTANEOUS at 14:50

## 2022-03-01 RX ADMIN — DIAZEPAM 5 MG: 5 TABLET ORAL at 09:17

## 2022-03-01 RX ADMIN — OXYCODONE HYDROCHLORIDE AND ACETAMINOPHEN 2 TABLET: 5; 325 TABLET ORAL at 19:14

## 2022-03-01 RX ADMIN — SODIUM CHLORIDE, PRESERVATIVE FREE 10 ML: 5 INJECTION INTRAVENOUS at 20:41

## 2022-03-01 RX ADMIN — OXYCODONE 10 MG: 5 TABLET ORAL at 09:17

## 2022-03-01 RX ADMIN — SODIUM CHLORIDE, PRESERVATIVE FREE 10 ML: 5 INJECTION INTRAVENOUS at 09:17

## 2022-03-01 RX ADMIN — DIAZEPAM 5 MG: 5 TABLET ORAL at 20:33

## 2022-03-01 RX ADMIN — METHOCARBAMOL 1000 MG: 100 INJECTION, SOLUTION INTRAMUSCULAR; INTRAVENOUS at 06:57

## 2022-03-01 RX ADMIN — OXYCODONE HYDROCHLORIDE AND ACETAMINOPHEN 2 TABLET: 5; 325 TABLET ORAL at 16:33

## 2022-03-01 RX ADMIN — DIAZEPAM 5 MG: 5 TABLET ORAL at 15:29

## 2022-03-01 ASSESSMENT — PAIN SCALES - GENERAL
PAINLEVEL_OUTOF10: 9
PAINLEVEL_OUTOF10: 10
PAINLEVEL_OUTOF10: 8
PAINLEVEL_OUTOF10: 7
PAINLEVEL_OUTOF10: 8
PAINLEVEL_OUTOF10: 9
PAINLEVEL_OUTOF10: 10
PAINLEVEL_OUTOF10: 8
PAINLEVEL_OUTOF10: 8
PAINLEVEL_OUTOF10: 9
PAINLEVEL_OUTOF10: 9

## 2022-03-01 ASSESSMENT — PAIN DESCRIPTION - PROGRESSION
CLINICAL_PROGRESSION: GRADUALLY WORSENING
CLINICAL_PROGRESSION: OTHER (COMMENT)
CLINICAL_PROGRESSION: GRADUALLY WORSENING

## 2022-03-01 ASSESSMENT — PAIN DESCRIPTION - FREQUENCY: FREQUENCY: CONTINUOUS

## 2022-03-01 ASSESSMENT — PAIN DESCRIPTION - DESCRIPTORS: DESCRIPTORS: ACHING;CONSTANT;DISCOMFORT

## 2022-03-01 ASSESSMENT — PAIN DESCRIPTION - LOCATION: LOCATION: BACK

## 2022-03-01 ASSESSMENT — PAIN - FUNCTIONAL ASSESSMENT: PAIN_FUNCTIONAL_ASSESSMENT: PREVENTS OR INTERFERES SOME ACTIVE ACTIVITIES AND ADLS

## 2022-03-01 ASSESSMENT — PAIN DESCRIPTION - PAIN TYPE: TYPE: CHRONIC PAIN

## 2022-03-01 ASSESSMENT — PAIN DESCRIPTION - ONSET: ONSET: ON-GOING

## 2022-03-01 NOTE — PLAN OF CARE
Problem: Falls - Risk of:  Goal: Will remain free from falls  Description: Will remain free from falls  Outcome: Ongoing   Pt remaining free from falls      Problem: Falls - Risk of:  Goal: Absence of physical injury  Description: Absence of physical injury  Outcome: Ongoing   Pt remaining free from physical injury

## 2022-03-01 NOTE — PROGRESS NOTES
I had the pleasure of taking care of Ms. Wes Ortega. Bedside report was performed and I introduced myself before updating the white board and performing an initial assessment of the patient and obtaining vitals. Explained the purpose of the white board, today's date, and how to call out for assistance. Patient is comfortable possessions and position adjusted for ease of use. Vitals for today's shift were as follows. .. Vitals:    02/28/22 2343 03/01/22 0300 03/01/22 0700 03/01/22 1115   BP: 135/86 129/80 123/86 133/89   Pulse: 86 77 69 94   Resp: 16 16 16 16   Temp: 98 °F (36.7 °C) 98 °F (36.7 °C) 97.9 °F (36.6 °C) 98.5 °F (36.9 °C)   TempSrc: Oral  Oral Oral   SpO2: 99%  98% 99%        LDAs have been examined and maintained including IVs flushed and patent. Additional concerns from this shift that were addressed include   - spoke with Dr. María Shoemaker regarding pain control and he states that he was going to look at meds and adjust for better control.

## 2022-03-01 NOTE — H&P
Hospital Medicine History & Physical      PCP: David Pizarro    Date of Admission: 2/28/2022    Date of Service: Pt seen/examined on 2/28/2022    Pt seen/examined face to face on and admitted as observation with expected LOS less than two midnights but that can change depending on respose to medical therapy and clinical progress. Chief Complaint:    Chief Complaint   Patient presents with    Neck Pain     has pain pump in place is not working correctly so having pain in neck here Fri and Sat for same        History Of Present Illness:      51 y.o. female who presented to [facility] with past history of chronic cervical pain, GERD, hypertension, migraine presented to the ED due to worsening neck pain. Next  Patient reported that she has a PCA pump that was changed due to the first 1 being broken. Patient however reported that she talk to the representative and was told that it was not broken. Patient reported that due to the pain pump being only half felt when the supposed to be empty there was concern about it not working properly. Patient then went to PCP and patient had a lumbar puncture and noted that opiates was indicated thus PCA pump was functioning. Patient reported that she has concerned that the PCA pump is not giving her enough medication. During the weekend patient had severe neck pain 10/10 that would be at the surgical site from prior surgeries in addition would have neck pain and posterior head pain that is lateral no known alleviating exacerbating factor no associate with fever chills. Patient however does report being drenching sweats every day for the past 2 days with concern being opiate withdrawal.  Patient otherwise denies having shortness of breath chest pain abdominal pain dysuria.   Patient reports that she is unable to do MRI due to the PCA pump not being compatible      Past Medical History:          Diagnosis Date    Chronic cervical pain     3 prior cervical fusions    DDD (degenerative disc disease), cervical     DDD (degenerative disc disease), cervical     DDD (degenerative disc disease), lumbar     GERD (gastroesophageal reflux disease)     HTN (hypertension)        Past Surgical History:          Procedure Laterality Date    BACK SURGERY      CATHETER INSERTION  05/10/2020    Intrathecal pain pump & catheter insertion. Fiordaliza Herr MD, done at [address]  x3 (2010, 2013, 2014)    PAIN MANAGEMENT PROCEDURE  11/19/2020    Spinal Pain Pump Replacement. Dr. Kisha Hampton       Medications Prior to Admission:      Prior to Admission medications    Medication Sig Start Date End Date Taking? Authorizing Provider   methylPREDNISolone (MEDROL, ELIO,) 4 MG tablet Take by mouth. 2/27/22 3/5/22  Isaiah Person MD   norgestrel-ethinyl estradiol (LO/OVRAL) 0.3-30 MG-MCG per tablet takse pills continuously 2/21/22   Historical Provider, MD   bisoprolol (ZEBETA) 10 MG tablet 1 PO Q AM AND 2 PO Q PM  Indications: TACHYCARDIA 2/22/22   Historical Provider, MD   cyclobenzaprine (FLEXERIL) 10 MG tablet TAKE 1 TABLET BY MOUTH 3 (THREE) TIMES DAILY AS NEEDED FOR UP TO 30 DAYS. 12/7/21   Historical Provider, MD   oxyCODONE-acetaminophen (PERCOCET)  MG per tablet Take 1 tablet by mouth every 4 hours as needed. Historical Provider, MD       Allergies:  Hydrocodone-acetaminophen, Penicillins, and Phenergan [promethazine hcl]    Social History:          TOBACCO:   reports that she has quit smoking. She has never used smokeless tobacco.  ETOH:   reports current alcohol use. E-Cigarettes/Vaping Use     Questions Responses    E-Cigarette/Vaping Use     Start Date     Passive Exposure     Quit Date     Counseling Given     Comments             Family History:      Reviewed in detail, and noncontributory    History reviewed. No pertinent family history.     REVIEW OF SYSTEMS:     Constitutional:  No Fever, No Chills, No Night Sweats  ENT/Mouth:  No Nasal Congestion,  No Hoarseness, No new mouth lesion  Eyes:  No Eye Pain, No Redness, No Discharge  Cardiovascular:  No Chest Pain, No Orthopnea, No Palpitations  Respiratory:  No Cough, No Sputum, No Dyspnea  Gastrointestinal: No Vomiting, No Diarrhea, No abdominal pain  Genitourinary: No Urinary Frequency, No Hematuria, No Urinary pain  Musculoskeletal: + Worsening Arthralgias, + worsening Myalgias  Skin:  No new Skin Lesions, No new skin rash  Neuro:  No new weakness, No new numbness. Psych:  No suicial ideation, No Violence ideation    PHYSICAL EXAM PERFORMED:    /86   Pulse 86   Temp 98 °F (36.7 °C) (Oral)   Resp 16   SpO2 99%     General appearance:  mild acute distress, appears older than stated age  HEENT:   atraumatic, sclera anicteric, Conjunctivae clear.,  Eating food comfortably. Neck: Supple,Trachea midline, no goiter, midline tenderness on the cervical and thoracic  Respiratory:minimal accessory muscle usage, Normal respiratory effort. Clear to auscultation, bilaterally without wheezing  Cardiovascular:  Regular rate and rhythm, capillary refill 2 seconds  Abdomen: Soft, non-tender, non-distended with normal bowel sounds. Musculoskeletal:  No clubbing, cyanosis. trace edema LE bilaterally. Skin: turgor normal.  No new rashes or lesions. Neurologic: Alert and oriented x4, no new focal sensory/motor deficits. Labs:     Recent Labs     02/26/22 2110   WBC 5.9   HGB 12.8   HCT 37.6        Recent Labs     02/26/22 2110      K 4.2      CO2 24   BUN 9   CREATININE 0.7   CALCIUM 9.3     No results for input(s): AST, ALT, BILIDIR, BILITOT, ALKPHOS in the last 72 hours. No results for input(s): INR in the last 72 hours. No results for input(s): Patricia Evin in the last 72 hours.     Urinalysis:      Lab Results   Component Value Date    NITRU Negative 07/31/2019    WBCUA 3-5 07/31/2019    BACTERIA 3+ 07/31/2019    RBCUA 0-2 07/31/2019 BLOODU TRACE-INTACT 07/31/2019    SPECGRAV 1.020 07/31/2019    GLUCOSEU Negative 07/31/2019       Radiology:     CXR: I have reviewed the CXR with the following interpretation:   Pending  EKG:  I have reviewed the EKG with the following interpretation:   Pending  No orders to display       ASSESSMENT AND PLAN:    ALBARO/Leonela Solomon 1106 Problems    Diagnosis Date Noted    Neck pain [M54.2] 02/28/2022     Acute neck pain:  Etiology unclear  Unclear if PCA pump is working functionally  Representative to be contacted  Neurosurgery consulted, much appreciated    Headache:  Improved with Dilaudid given in the ED  Continue to monitor if worsens will give NSAIDs    Essential Hypertension: Continue home medication  GERD: Continue home medication    Diet: NPO except meds ordered    DVT Prophylaxis: held    Dispo:   Expected LOS less than two [facility], DO

## 2022-03-01 NOTE — CARE COORDINATION
Addendum: I spoke with Prasanna FREY and Dr Corinne Cedars is going to send clinicals to Dr Mary Bearden and Dr Mary Bearden office is going to call the patient to schedule an appt with her directly. I have conveyed this information to the patient. Electronically signed by Lacie Grey RN on 3/1/2022 at 3:07 PM         Case Management Assessment           Initial Evaluation                Date / Time of Evaluation: 3/1/2022 12:02 PM                 Assessment Completed by: Lacie Grey RN     Patient is from home with her spouse and son. She is independent with all ADL's and had no services or DME needs prior. She is able to return to home and needs to follow up Dr Mary Bearden to have her pain pump replaced. Her spouse is able to transport her to home at d/c. Patient Name: Pedro Pablo Hooker     YOB: 1975  Diagnosis: Neck pain [M54.2]     Date / Time: 2/28/2022  6:52 PM    Patient Admission Status: Inpatient    If patient is discharged prior to next notation, then this note serves as note for discharge by case management.      Current PCP: [address] [place] [address] Patient: No    Chart Reviewed: Yes  Patient/ Family Interviewed: Yes    Initial assessment completed at bedside with: patient    Hospitalization in the last 30 days: No    Emergency Contacts:  Extended Emergency Contact Information  Primary Emergency Contact: tanja silver  Home Phone: [phone]  Relation: Spouse    Advance Directives:   Code Status: Full [address]: No  Agent: NA  Contact Number: NA    Financial  Payor: [facility] / Plan: [facility] / Product Type: *No Product type* /     Pre-cert required for SNF: Yes    Pharmacy    [facility]/pharmacy [address] [place] [address] [phone]  [address] [place] [address]  Phone: [phone] Fax: [phone]      Potential assistance Purchasing Medications:    Does Patient want to participate in local refill/ meds to beds program?:      Meds To Beds General Rules:  1. Can ONLY be done Monday- Friday between 8:30am-5pm  2. Prescription(s) must be in pharmacy by 3pm to be filled same day  3. Copy of patient's insurance/ prescription drug card and patient face sheet must be sent along with the prescription(s)  4. Cost of Rx cannot be added to hospital bill. If financial assistance is needed, please contact unit  or ;  or  CANNOT provide pharmacy voucher for patients co-pays  5. Patients can then  the prescription on their way out of the hospital at discharge, or pharmacy can deliver to the bedside if staff is available. (payment due at time of pick-up or delivery - cash, check, or card accepted)     Able to afford home medications/ co-pay costs: Yes    ADLS  Support Systems: Family Members    PT AM-PAC:   /24  OT AM-PAC:   /24    [place]: house  Steps: unsure    Plans to RETURN to current housing: Yes  Barriers to RETURNING to current housing: none noted    DISCHARGE PLAN:  Disposition: Home- No Services Needed    Transportation PLAN for discharge: family     Factors facilitating achievement of predicted outcomes: Family support, Cooperative and Pleasant    Barriers to discharge: Pain    Additional Case Management Notes: NA    The Plan for Transition of Care is related to the following treatment goals of Neck pain [M54.2]    The Patient and/or patient representative Fadia Shows and her family were provided with a choice of provider and agrees with the discharge plan Not Indicated    Freedom of choice list was provided with basic dialogue that supports the patient's individualized plan of care/goals and shares the quality data associated with the providers.  Not Indicated    Care Transition patient: No    Rebecca Gutierrez RN  [facility]  Case Management Department  Ph: [phone]   Fax: [phone]

## 2022-03-01 NOTE — PROGRESS NOTES
Hospitalist Progress Note      PCP: Raheel Art    Date of Admission: 2/28/2022    CC neck pain. Hospital course  Patient is 77-year-old female with history of multiple cervical surgeries s/p pain pump placement came into ER with a complaint of neck pain. Patient was seen by Dr. Jorge Luis Burciaga on 2/24 regarding the discrepancies in the amount of Dilaudid found in her pump pump. Physician tested the patency of the pump catheter by expressing a couple of months from catheter that include Dilaudid and CSF. Patient had multiple visits to ER for pain in cervical area. Was admitted for neurosurgery eval and possible pump exchange. Subjective  Patient seen and examined today still complaining of cervical pain 10/10 denies any nausea, vomiting, fever, chills. Assessment and plan  #Acute on chronic cervical pain. Patient has history of multiple cervical surgeries. She has a PCA pump evaluated by neurosurgery, no surgical intervention. Discussed with Dr. Cris Mansfield recommended pain control with Percocet  ESR, sed rate within normal limit. #Sinus node tachycardia  On bisoprolol at home. Medications:  Reviewed    Infusion Medications    sodium chloride       Scheduled Medications    methocarbamol IVPB  1,000 mg IntraVENous Q8H    enoxaparin  40 mg SubCUTAneous Daily    sodium chloride flush  10 mL IntraVENous 2 times per day     PRN Meds: diazePAM, oxyCODONE-acetaminophen **OR** oxyCODONE-acetaminophen, sodium chloride flush, sodium chloride, potassium chloride, magnesium sulfate, promethazine **OR** ondansetron, acetaminophen **OR** acetaminophen    No intake or output data in the 24 hours ending 03/01/22 1252    Physical Exam Performed:    /89   Pulse 94   Temp 98.5 °F (36.9 °C) (Oral)   Resp 16   SpO2 99%     General appearance: No apparent distress, appears stated age and cooperative. HEENT: Pupils equal, round, and reactive to light. Conjunctivae/corneas clear.   Neck: Supple, with full range of motion. No jugular venous distention. Trachea midline. Respiratory:  Normal respiratory effort. Clear to auscultation, bilaterally without Rales/Wheezes/Rhonchi. Cardiovascular: Regular rate and rhythm with normal S1/S2 without murmurs, rubs or gallops. Abdomen: Soft, non-tender, non-distended with normal bowel sounds. Musculoskeletal: No clubbing, cyanosis or edema bilaterally. Full range of motion without deformity. Skin: Skin color, texture, turgor normal.  No rashes or lesions. Neurologic:  Neurovascularly intact without any focal sensory/motor deficits. Cranial nerves: II-XII intact, grossly non-focal.  Psychiatric: Alert and oriented, thought content appropriate, normal insight  Capillary Refill: Brisk,< 3 seconds   Peripheral Pulses: +2 palpable, equal bilaterally       Labs:   Recent Labs     02/26/22 2110 03/01/22  0633   WBC 5.9 7.4   HGB 12.8 12.0   HCT 37.6 35.4*    272     Recent Labs     02/26/22 2110 03/01/22  0633    139   K 4.2 4.1    105   CO2 24 26   BUN 9 16   CREATININE 0.7 0.7   CALCIUM 9.3 8.9     Recent Labs     03/01/22  0633   AST 20   ALT 27   BILITOT 0.6   ALKPHOS 45     No results for input(s): INR in the last 72 hours. No results for input(s): [employer] in the last 72 hours. Urinalysis:      Lab Results   Component Value Date    NITRU Negative 07/31/2019    WBCUA 3-5 07/31/2019    BACTERIA 3+ 07/31/2019    RBCUA 0-2 07/31/2019    BLOODU TRACE-INTACT 07/31/2019    SPECGRAV 1.020 07/31/2019    GLUCOSEU Negative 07/31/2019       Radiology:  No orders to display           Assessment/Plan:    Active Hospital Problems    Diagnosis Date Noted    Neck pain [M54.2] 02/28/2022         DVT Prophylaxis: lovenox  Diet: ADULT DIET;  Regular  Code Status: Full Code    PT/OT Eval Status: N/A    Dispo - pain control     Manju Mathew MD     This chart was likely completed using voice recognition technology and may contain unintended grammatical , phraseology,and/or punctuation errors

## 2022-03-01 NOTE — CONSULTS
NEUROSURGERY CONSULT NOTE    Chivo Aj  [id number]   1975   3/1/2022    Requesting physician: Qamar Piedra DO    Reason for consultation: Cervical Pain in setting of possible intrathecal pain pump malfunction. History of present illness: Patient is a 51 y.o. female has a past medical history of Chronic cervical pain, DDD (degenerative disc disease), GERD (gastroesophageal reflux disease), and HTN (hypertension) and w/ History of Procedures for Cervical Pain:  · C5-7 ACDF by Dr. Eloy Aguirre 1/12/2010  · C4-5 ACDF by Dr. Eloy Aguirre 1/15/2013  · Posterior Cervical Fusion C6-7 by Dr. Jyoti Taylor 10/15/2014  · Removal Plate at Z0-5 and exploration of fusion by Dr. Jyoti Taylor 2/19/2015  · Met with Dr. Anastacia Vazquez and Dr. Dashawn Mane 11/2015 and no surgical intervention was recommended, but PT was recommended  · Pain Pump Implant by Dr. Keren Mccracken 11/29/2018  · Pain Pump Implant by Dr. Nay Gee 11/19/2020    Patient was seen on 2/24/2022 in Dr. Salma Herzog office along with [facility], Braydon Moscoso, regarding the discrepacncies in amount of Dilaudid found in her pain pump. During the visit Dr. Freddy Edward tested the patency of the pain pump catheter by expressing a couple of mL's from catheter that included Dilaudid and CSF. Dr. Freddy Edward felt the catheter was patent from its ability to pull CSF and any discrepancies must be due to the pain pump itself. The pump was primed with Dilaudid by Braydon Moscoso, and the patient was to follow up with Dr. Freddy Edward on 3/9/2022. Patient was seen at [facility] ED on 2/25/2022 for the exact same issue. The patient was evaluated and found to have an acute exacerbation of her chronic neck pain, no neurological deficits, no evidence of any meningismus, no fevers, and WBC WNL. She was told to follow up with her pain management doctor, Dr. Keren Mccracken, early this week.  Patient was seen approximately 24 hours later in [facility] ED for repeat evaluation of recurrent acute on chronic exacerbation of her cervical pain, in addition to some thoracic pain. The ED physician talked with the patient and her pain physician (via phone) regarding her pain and it was decided to do a full work up for acute injury and/or infection. The CBC and BMP were WNL, the CT noncontrast imaging of her cervical, thoracic and lumbar spine did not show any evidence of acute fracture, deformity or other acute pathological abnormality. ESR and CRP were elevated, which may have been reactive to the accessing of patient's pain pump catheter on 2/24/2022. The ED physician discussed the very low likelihood of meningitis, spinal abscess, epidural abscess or other abnormal fluid collection in the region that her intrathecal catheter traverses. She was given a steroid dosepak upon discharge from ED given the presence of the elevated inflammatory markers (ESR & CRP) along with her as of yet not clearly identified source of increased pain. Patient presented to [facility] ED for a [facility] time on 2/28/2022 c/o acute on chronic exacerbation of her cervical pain. The patient states that she has continued to have persistent discomfort, however states that the symptoms have not changed in character, prompting her presentation to the emergency department, requesting to be evaluated by neurosurgery for potential pain pump replacement, which was Dr. Tereso Rinne recommendation on 2/26/2022. ROS:   GENERAL:  Denies fever or recent illness.  Denies weight changes   EYES:  Denies vision change or diplopia  EARS:  Denies hearing loss  CARDIAC:  Denies chest pain  RESPIRATORY:  Denies shortness of breath  SKIN:  Denies rash or lesions   HEM:  Denies excessive bruising  PSYCH:  Denies anxiety or depression  NEURO:  Denies headache, numbness or tingling or lateralizing weakness   :  Denies urinary difficulty  GI: Denies nausea, vomiting, diarrhea or constipation  MUSCULOSKELETAL: Endorses cervical and upper thoracic pain    Allergies Allergen Reactions    Hydrocodone-Acetaminophen Hives and Rash    Penicillins Hives    Phenergan [Promethazine Hcl] Palpitations       Past Medical History:   Diagnosis Date    Chronic cervical pain     3 prior cervical fusions    DDD (degenerative disc disease), cervical     DDD (degenerative disc disease), cervical     DDD (degenerative disc disease), lumbar     GERD (gastroesophageal reflux disease)     HTN (hypertension)         Past Surgical History:   Procedure Laterality Date    BACK SURGERY      CATHETER INSERTION  05/10/2020    Intrathecal pain pump & catheter insertion. Maikel Stark MD, done at [facility]  x3 (2010, 2013, 2014)    PAIN MANAGEMENT PROCEDURE  11/19/2020    Spinal Pain Pump Replacement. Dr. Dania Joyce Not on file   Tobacco Use    Smoking status: Former Smoker    Smokeless tobacco: Never Used   Substance and Sexual Activity    Alcohol use: Yes     Comment: social    Drug use: Never    Sexual activity: Not on file        History reviewed. No pertinent family history. No outpatient medications have been marked as taking for the 2/28/22 encounter [facility] Encounter).         Current Facility-Administered Medications   Medication Dose Route Frequency Provider Last Rate Last Admin    diazePAM (VALIUM) tablet 5 mg  5 mg Oral Q6H PRN Vicki Mcfarland APRN - JESUS        methocarbamol (ROBAXIN) 1,000 mg in dextrose 5 % 100 mL IVPB  1,000 mg IntraVENous Q8H Vicki Mcfarland APRN - CNP        sodium chloride flush 0.9 % injection 10 mL  10 mL IntraVENous 2 times per day Prema Jeanne A Kyung, DO   10 mL at 03/01/22 0002    sodium chloride flush 0.9 % injection 10 mL  10 mL IntraVENous PRN Ash Tracey, DO        0.9 % sodium chloride infusion  25 mL IntraVENous PRN Keniad VISHAL Tracey, DO        potassium chloride 10 mEq/100 mL IVPB (Peripheral Line)  10 mEq IntraVENous PRN Richy Gupta Kyung, DO        magnesium sulfate 2000 mg in 50 mL IVPB premix  2,000 mg IntraVENous PRN Wyona Theresa A Kyung, DO        promethazine (PHENERGAN) tablet 12.5 mg  12.5 mg Oral Q6H PRN Ahmad A Kyung, DO        Or    ondansetron (ZOFRAN) injection 4 mg  4 mg IntraVENous Q6H PRN Krishan Foil Kyung, DO        acetaminophen (TYLENOL) tablet 650 mg  650 mg Oral Q6H PRN Ahmad A Kyung, DO        Or    acetaminophen (TYLENOL) suppository 650 mg  650 mg Rectal Q6H PRN Krishan Foil Kyung, DO        oxyCODONE (ROXICODONE) immediate release tablet 5 mg  5 mg Oral Q4H PRN Krishan Foil Kyung, DO        oxyCODONE (ROXICODONE) immediate release tablet 10 mg  10 mg Oral Q4H PRN Ahmad A Kyung, DO   10 mg at 03/01/22 0441        Objective:  /80   Pulse 77   Temp 98 °F (36.7 °C)   Resp 16   SpO2 99%     Physical Exam:   Patient seen and examined  GCS:  4 - Opens eyes on own  5 - Alert and oriented  6 - Follows simple motor commands  General: Well developed. Alert and cooperative in no acute distress. HENT: atraumatic, neck supple  Eyes: Optic discs: Not tested  Pulmonary: unlabored respiratory effort  Cardiovascular:  Warm well perfused. No peripheral edema  Gastrointestinal: abdomen soft, NT, ND    Neurological:  Mental Status: Awake, alert, oriented x 4, speech clear and appropriate  Attention: Intact  Language: No aphasia or dysarthria noted  Sensation: Intact to all extremities to light touch  Coordination: Intact    Musculoskeletal:   Gait: Not tested   Assist devices: None   Tone: Normal  Motor strength:    Right  Left    Right  Left    Deltoid  5 5  Hip Flex  5 5   Biceps  5 5  Knee Extensors  5 5   Triceps  5 5  Knee Flexors  5 5   Wrist Ext  5 5  Ankle Dorsiflex. 5 5   Wrist Flex  5 5  Ankle Plantarflex. 5 5   Handgrip  5 5  Ext Wayne Longus  5 5   Thumb Ext  5 5         Radiological Findings:  CT CERVICAL SPINE WO CONTRAST  Result Date: 2/26/2022  No acute fracture with mature bony fusion C4-C7 2.  Normal alignment    CT THORACIC SPINE WO CONTRAST  Result Date: 2/26/2022  No acute fracture or deformity. CT LUMBAR SPINE WO CONTRAST  Result Date: 2/26/2022  Previous fusion at L5-S1 with cage placement. No sign of any spondylolisthesis or fracture. There is an epidural catheter entering underneath the transverse process at L3-4 and extending along the epidural space to T10 or T10-11 region       Labs:  Recent Labs     02/26/22 2110   WBC 5.9   HGB 12.8   HCT 37.6          Recent Labs     02/26/22 2110      K 4.2      CO2 24   BUN 9   CREATININE 0.7   GLUCOSE 125*   CALCIUM 9.3       No results for input(s): PROTIME, INR, APTT in the last 72 hours. Patient Active Problem List    Diagnosis Date Noted    Neck pain 02/28/2022    Breakdown (mechanical) of other nervous system device, implant or graft[facility] 11/19/2020    Overweight 11/06/2020    Abnormal uterine bleeding (AUB) 04/06/2017    Generalized abdominal or pelvic swelling or mass or lump 08/06/2015    Cervical post-laminectomy syndrome 03/05/2015    Cervicalgia 03/05/2015    Cervical radiculopathy, chronic 12/15/2014    Displacement of cervical intervertebral disc without myelopathy 12/15/2014    Cervical disc disorder with myelopathy of cervicothoracic region 12/15/2014    Cervical pseudoarthrosis ([facility] 08/15/2014    Pseudarthrosis after fusion or arthrodesis 01/15/2013       Assessment:  Patient is a 51 y.o. female w/acute on chronic cervical pain. Plan:  1. No emergent neurosurgical intervention indicated  2. Neurologic exams frequency: Q4H  3. For change in exam MUST contact neurosurgery team along with critical care or primary team  4.  Neck Pain:  - CT Cervical/Thoracic/Lumbar revealed no acute abnormalities  - Pain pump is not MRI compatible  - Muscle spasms: Robaxin and PRN Valium  - Pain control: Managed by medical team inpatient and should follow up w/Dr. Buckley Danger  - Follow up w/Dr. Eder Rosenberg if pain pump replacement required  5. Advance diet / activity per primary team  6. Thank you for consult. Will sign off. Please call with any questions or decline in neurological status    DISPO: Dispo timing to be determined by primary team once patient is medically stable for discharge. Patient was seen and examined with Dr. Avery Dimas who agrees with above assessment and plan.      Electronically signed by: Kathryn Atkins, [facility], APRN-CNP, 3/1/2022 6:54 AM  [phone]

## 2022-03-02 VITALS
OXYGEN SATURATION: 98 % | SYSTOLIC BLOOD PRESSURE: 135 MMHG | HEART RATE: 110 BPM | RESPIRATION RATE: 18 BRPM | TEMPERATURE: 97.6 F | DIASTOLIC BLOOD PRESSURE: 79 MMHG

## 2022-03-02 PROCEDURE — 6360000002 HC RX W HCPCS: Performed by: INTERNAL MEDICINE

## 2022-03-02 PROCEDURE — 6370000000 HC RX 637 (ALT 250 FOR IP): Performed by: INTERNAL MEDICINE

## 2022-03-02 PROCEDURE — G0378 HOSPITAL OBSERVATION PER HR: HCPCS

## 2022-03-02 PROCEDURE — 96366 THER/PROPH/DIAG IV INF ADDON: CPT

## 2022-03-02 PROCEDURE — 2580000003 HC RX 258: Performed by: INTERNAL MEDICINE

## 2022-03-02 PROCEDURE — 96372 THER/PROPH/DIAG INJ SC/IM: CPT

## 2022-03-02 PROCEDURE — 6370000000 HC RX 637 (ALT 250 FOR IP): Performed by: NURSE PRACTITIONER

## 2022-03-02 RX ORDER — OXYCODONE HCL 10 MG/1
10 TABLET, FILM COATED, EXTENDED RELEASE ORAL 2 TIMES DAILY
Qty: 10 TABLET | Refills: 0 | Status: SHIPPED | OUTPATIENT
Start: 2022-03-02 | End: 2022-03-07

## 2022-03-02 RX ADMIN — DIAZEPAM 5 MG: 5 TABLET ORAL at 03:38

## 2022-03-02 RX ADMIN — OXYCODONE HYDROCHLORIDE AND ACETAMINOPHEN 2 TABLET: 5; 325 TABLET ORAL at 08:48

## 2022-03-02 RX ADMIN — METOPROLOL TARTRATE 50 MG: 50 TABLET, FILM COATED ORAL at 08:48

## 2022-03-02 RX ADMIN — SODIUM CHLORIDE, PRESERVATIVE FREE 10 ML: 5 INJECTION INTRAVENOUS at 08:50

## 2022-03-02 RX ADMIN — DIAZEPAM 5 MG: 5 TABLET ORAL at 09:33

## 2022-03-02 RX ADMIN — OXYCODONE HYDROCHLORIDE AND ACETAMINOPHEN 2 TABLET: 5; 325 TABLET ORAL at 05:17

## 2022-03-02 RX ADMIN — DIAZEPAM 5 MG: 5 TABLET ORAL at 03:45

## 2022-03-02 RX ADMIN — OXYCODONE HYDROCHLORIDE AND ACETAMINOPHEN 2 TABLET: 5; 325 TABLET ORAL at 01:07

## 2022-03-02 RX ADMIN — DIAZEPAM 5 MG: 5 TABLET ORAL at 15:21

## 2022-03-02 RX ADMIN — OXYCODONE HYDROCHLORIDE AND ACETAMINOPHEN 2 TABLET: 5; 325 TABLET ORAL at 12:18

## 2022-03-02 RX ADMIN — ENOXAPARIN SODIUM 40 MG: 100 INJECTION SUBCUTANEOUS at 08:49

## 2022-03-02 ASSESSMENT — PAIN DESCRIPTION - ONSET
ONSET: ON-GOING
ONSET: ON-GOING

## 2022-03-02 ASSESSMENT — PAIN DESCRIPTION - PROGRESSION
CLINICAL_PROGRESSION: NOT CHANGED
CLINICAL_PROGRESSION: NOT CHANGED

## 2022-03-02 ASSESSMENT — PAIN - FUNCTIONAL ASSESSMENT
PAIN_FUNCTIONAL_ASSESSMENT: PREVENTS OR INTERFERES SOME ACTIVE ACTIVITIES AND ADLS
PAIN_FUNCTIONAL_ASSESSMENT: PREVENTS OR INTERFERES SOME ACTIVE ACTIVITIES AND ADLS

## 2022-03-02 ASSESSMENT — PAIN SCALES - GENERAL
PAINLEVEL_OUTOF10: 9
PAINLEVEL_OUTOF10: 7

## 2022-03-02 ASSESSMENT — PAIN DESCRIPTION - PAIN TYPE
TYPE: CHRONIC PAIN
TYPE: CHRONIC PAIN

## 2022-03-02 ASSESSMENT — PAIN DESCRIPTION - FREQUENCY
FREQUENCY: CONTINUOUS
FREQUENCY: CONTINUOUS

## 2022-03-02 ASSESSMENT — PAIN DESCRIPTION - LOCATION
LOCATION: BACK
LOCATION: BACK

## 2022-03-02 ASSESSMENT — PAIN DESCRIPTION - DESCRIPTORS
DESCRIPTORS: ACHING;CONSTANT;DISCOMFORT
DESCRIPTORS: ACHING;CONSTANT;DISCOMFORT

## 2022-03-02 NOTE — CARE COORDINATION
Case Management Assessment            Discharge Note                    Date / Time of Note: 3/2/2022 11:54 AM                  Discharge Note Completed by: Jareth Tobias RN    Patient Name: Carlton Alvarenga   YOB: 1975  Diagnosis: Neck pain [M54.2]   Date / Time: 2/28/2022  6:52 PM    Current PCP: [id number] [facility] patient: No    Hospitalization in the last 30 days: No    Advance Directives:  Code Status: Full Code  [place] DNR form completed and on chart: Not Indicated    Financial:  Payor: Glenis Knowles / Plan: Lois / Product Type: *No Product type* /      Pharmacy:    [address] [place] [address] [address] [id number]  Phone: [phone] Fax: [phone]      Assistance purchasing medications?:    Assistance provided by Case Management: None at this time    Does patient want to participate in local refill/ meds to beds program?:      Meds To Beds General Rules:  1. Can ONLY be done Monday- Friday between 8:30am-5pm  2. Prescription(s) must be in pharmacy by 3pm to be filled same day  3. Copy of patient's insurance/ prescription drug card and patient face sheet must be sent along with the prescription(s)  4. Cost of Rx cannot be added to hospital bill. If financial assistance is needed, please contact unit  or ;  or  CANNOT provide pharmacy voucher for patients co-pays  5.  Patients can then  the prescription on their way out of the hospital at discharge, or pharmacy can deliver to the bedside if staff is available. (payment due at time of pick-up or delivery - cash, check, or card accepted)     Able to afford home medications/ co-pay costs: Yes    ADLS:  Current PT AM-PAC Score:   /24  Current OT AM-PAC Score:   /24      DISCHARGE Disposition: Home- No Services Needed    LOC at discharge: Not Applicable  JESSICA Completed: Not Indicated    Notification completed in HENS/PAS?:  Not Applicable    IMM Completed:   Not Indicated    Transportation:  Transportation PLAN for discharge: family   Mode of Transport: Private Car    The Plan for Transition of Care is related to the following treatment goals of Neck pain [M54.2]    The Patient and/or patient representative [facility] and her family were provided with a choice of provider and agrees with the discharge plan Not Indicated    Freedom of choice list was provided with basic dialogue that supports the patient's individualized plan of care/goals and shares the quality data associated with the providers.  Not Indicated    Care Transitions patient: Ev Rene RN  [facility] [employer]  Case Management Department  Ph: [phone]  Fax: [phone]

## 2022-03-02 NOTE — PROGRESS NOTES
VSS, pt tachycardic at times d/t pain. Afebrile  Pt ambulates adlib  Call light and belongings in reach. Nonskid socks on  Updated on daily care plan.  No new problems assessed

## 2022-03-02 NOTE — CONSULTS
NEUROSURGERY CONSULT NOTE    Satish Coates  [id number]   1975   3/1/2022    Requesting physician: Sarah Rich DO    Reason for consultation: Cervical Pain in setting of possible intrathecal pain pump malfunction. History of present illness: Patient is a 51 y.o. female has a past medical history of Chronic cervical pain, DDD (degenerative disc disease), GERD (gastroesophageal reflux disease), and HTN (hypertension) and w/ History of Procedures for Cervical Pain:  · C5-7 ACDF by Dr. Rodrigo Walton 1/12/2010  · C4-5 ACDF by Dr. Rodrigo Walton 1/15/2013  · Posterior Cervical Fusion C6-7 by Dr. Jihan Miller 10/15/2014  · Removal Plate at Y8-5 and exploration of fusion by Dr. Jihan Miller 2/19/2015  · Met with Dr. Martinez Scales and Dr. Jimy Jaramillo 11/2015 and no surgical intervention was recommended, but PT was recommended  · Pain Pump Implant by Dr. Earnestine Hooks 11/29/2018  · Pain Pump Implant by Dr. Kurt Jimenez 11/19/2020    Patient was seen on 2/24/2022 in Dr. Jerrell Santana office along with the rep from [facility], Naseem Castillo, regarding the discrepacncies in amount of Dilaudid found in her pain pump. During the visit Dr. Cheryle Harris tested the patency of the pain pump catheter by expressing a couple of mL's from catheter that included Dilaudid and CSF. Dr. Cheryle Harris felt the catheter was patent from its ability to pull CSF and any discrepancies must be due to the pain pump itself. The pump was primed with Dilaudid by Naseem Castillo, and the patient was to follow up with Dr. Cheryle Harris on 3/9/2022. Patient was seen at [facility] ED on 2/25/2022 for the exact same issue. The patient was evaluated and found to have an acute exacerbation of her chronic neck pain, no neurological deficits, no evidence of any meningismus, no fevers, and WBC WNL. She was told to follow up with her pain management doctor, Dr. Earnestine Hooks, early this week.  Patient was seen approximately 24 hours later in [facility] ED for repeat evaluation of recurrent acute on chronic exacerbation of her cervical pain, in addition to some thoracic pain. The ED physician talked with the patient and her pain physician (via phone) regarding her pain and it was decided to do a full work up for acute injury and/or infection. The CBC and BMP were WNL, the CT noncontrast imaging of her cervical, thoracic and lumbar spine did not show any evidence of acute fracture, deformity or other acute pathological abnormality. ESR and CRP were elevated, which may have been reactive to the accessing of patient's pain pump catheter on 2/24/2022. The ED physician discussed the very low likelihood of meningitis, spinal abscess, epidural abscess or other abnormal fluid collection in the region that her intrathecal catheter traverses. She was given a steroid dosepak upon discharge from ED given the presence of the elevated inflammatory markers (ESR & CRP) along with her as of yet not clearly identified source of increased pain. Patient presented to [facility] ED for a third time on 2/28/2022 c/o acute on chronic exacerbation of her cervical pain. The patient states that she has continued to have persistent discomfort, however states that the symptoms have not changed in character, prompting her presentation to the emergency department, requesting to be evaluated by neurosurgery for potential pain pump replacement, which was Dr. Asa Spencer recommendation on 2/26/2022. ROS:   GENERAL:  Denies fever or recent illness.  Denies weight changes   EYES:  Denies vision change or diplopia  EARS:  Denies hearing loss  CARDIAC:  Denies chest pain  RESPIRATORY:  Denies shortness of breath  SKIN:  Denies rash or lesions   HEM:  Denies excessive bruising  PSYCH:  Denies anxiety or depression  NEURO:  Denies headache, numbness or tingling or lateralizing weakness   :  Denies urinary difficulty  GI: Denies nausea, vomiting, diarrhea or constipation  MUSCULOSKELETAL: Endorses cervical and upper thoracic pain    Allergies Allergen Reactions    Hydrocodone-Acetaminophen Hives and Rash    Penicillins Hives    Phenergan [Promethazine Hcl] Palpitations       Past Medical History:   Diagnosis Date    Chronic cervical pain     3 prior cervical fusions    DDD (degenerative disc disease), cervical     DDD (degenerative disc disease), cervical     DDD (degenerative disc disease), lumbar     GERD (gastroesophageal reflux disease)     HTN (hypertension)         Past Surgical History:   Procedure Laterality Date    BACK SURGERY      CATHETER INSERTION  05/10/2020    Intrathecal pain pump & catheter insertion. Nagi Cartwright MD, done at [facility]  x3 (2010, 2013, 2014)    PAIN MANAGEMENT PROCEDURE  11/19/2020    Spinal Pain Pump Replacement. Dr. Moffett Mom Not on file   Tobacco Use    Smoking status: Former Smoker    Smokeless tobacco: Never Used   Substance and Sexual Activity    Alcohol use: Yes     Comment: social    Drug use: Never    Sexual activity: Not on file        History reviewed. No pertinent family history. No outpatient medications have been marked as taking for the 2/28/22 encounter [facility] Encounter).         Current Facility-Administered Medications   Medication Dose Route Frequency Provider Last Rate Last Admin    diazePAM (VALIUM) tablet 5 mg  5 mg Oral Q6H PRN RHIANNA Sevilla CNP   5 mg at 03/01/22 2033    methocarbamol (ROBAXIN) 1,000 mg in dextrose 5 % 100 mL IVPB  1,000 mg IntraVENous Q8H RHIANNA Sevilla CNP   Stopped at 03/01/22 1530    oxyCODONE-acetaminophen (PERCOCET) 5-325 MG per tablet 1 tablet  1 tablet Oral Q4H PRN Mj Martinez MD        Or    oxyCODONE-acetaminophen (PERCOCET) 5-325 MG per tablet 2 tablet  2 tablet Oral Q4H PRN Mj Martinez MD   2 tablet at 03/01/22 1914    enoxaparin (LOVENOX) injection 40 mg  40 mg SubCUTAneous Daily Mj Matrinez MD   40 mg at 03/01/22 1450    metoprolol tartrate (LOPRESSOR) tablet 50 mg  50 mg Oral BID Laura Villaneuva MD   50 mg at 03/01/22 2033    sodium chloride flush 0.9 % injection 10 mL  10 mL IntraVENous 2 times per day Lieutenant Allen Tracey, DO   10 mL at 03/01/22 2041    sodium chloride flush 0.9 % injection 10 mL  10 mL IntraVENous PRN Lieutenant Allen Tracey, DO        0.9 % sodium chloride infusion  25 mL IntraVENous PRN Ana Paula Tracey, DO        potassium chloride 10 mEq/100 mL IVPB (Peripheral Line)  10 mEq IntraVENous PRN Yashira Freeman,         magnesium sulfate 2000 mg in 50 mL IVPB premix  2,000 mg IntraVENous PRN Ana Paula Tracey, DO        promethazine (PHENERGAN) tablet 12.5 mg  12.5 mg Oral Q6H PRN Ash Tracey, DO        Or    ondansetron (ZOFRAN) injection 4 mg  4 mg IntraVENous Q6H PRN Ana Paula Tracey, DO        acetaminophen (TYLENOL) tablet 650 mg  650 mg Oral Q6H PRN Ash Tracey, DO        Or    acetaminophen (TYLENOL) suppository 650 mg  650 mg Rectal Q6H PRN Ash Tracey, DO            Objective:  BP (!) 138/94   Pulse 123   Temp 98.4 °F (36.9 °C) (Axillary)   Resp 16   SpO2 97%     Physical Exam:   Patient seen and examined  GCS:  4 - Opens eyes on own  5 - Alert and oriented  6 - Follows simple motor commands  General: Well developed. Alert and cooperative in no acute distress. HENT: atraumatic, neck supple  Eyes: Optic discs: Not tested  Pulmonary: unlabored respiratory effort  Cardiovascular:  Warm well perfused.  No peripheral edema  Gastrointestinal: abdomen soft, NT, ND    Neurological:  Mental Status: Awake, alert, oriented x 4, speech clear and appropriate  Attention: Intact  Language: No aphasia or dysarthria noted  Sensation: Intact to all extremities to light touch  Coordination: Intact    Musculoskeletal:   Gait: Not tested   Assist devices: None   Tone: Normal  Motor strength:    Right  Left    Right  Left    Deltoid  5 5  Hip Flex  5 5   Biceps  5 5  Knee Extensors  5 5   Triceps  5 5  Knee Flexors 5 5   Wrist Ext  5 5  Ankle Dorsiflex. 5 5   Wrist Flex  5 5  Ankle Plantarflex. 5 5   Handgrip  5 5  Ext Wayne Longus  5 5   Thumb Ext  5 5         Radiological Findings:    I personally reviewed the patient's imaging which consisted of a CT of the cervical, thoracic, and lumbar spine dated 2/26/2022. These demonstrate no acute abnormalities. Previous L5-S1 fusion is noted and an intrathecal catheter is noted at L3-4 extending superiorly to T10. Labs:  Recent Labs     03/01/22  0633   WBC 7.4   HGB 12.0   HCT 35.4*          Recent Labs     03/01/22  0633      K 4.1      CO2 26   BUN 16   CREATININE 0.7   GLUCOSE 81   CALCIUM 8.9       No results for input(s): PROTIME, INR, APTT in the last 72 hours. Patient Active Problem List    Diagnosis Date Noted    Neck pain 02/28/2022    Breakdown (mechanical) of other nervous system device, implant or graft[facility] 11/19/2020    Overweight 11/06/2020    Abnormal uterine bleeding (AUB) 04/06/2017    Generalized abdominal or pelvic swelling or mass or lump 08/06/2015    Cervical post-laminectomy syndrome 03/05/2015    Cervicalgia 03/05/2015    Cervical radiculopathy, chronic 12/15/2014    Displacement of cervical intervertebral disc without myelopathy 12/15/2014    Cervical disc disorder with myelopathy of cervicothoracic region 12/15/2014    Cervical pseudoarthrosis ([facility] 08/15/2014    Pseudarthrosis after fusion or arthrodesis 01/15/2013       Assessment:  Patient is a 51 y.o. female w/acute on chronic cervical pain. Plan:  1. No emergent neurosurgical intervention indicated  2. Neurologic exams frequency: Q4H  3. For change in exam MUST contact neurosurgery team along with critical care or primary team  4.  Neck Pain:  - CT Cervical/Thoracic/Lumbar revealed no acute abnormalities  - Pain pump is not MRI compatible  - Muscle spasms: Robaxin and PRN Valium  - Pain control: Managed by medical team inpatient and should follow up w/Dr. Ricky Aleman  - Follow up w/Dr. Cherise Barbers if pain pump replacement required  5. Thank you for consult. Will follow peripherally while in house.   Please call with any questions or decline in neurological status    Terrence Brown MD, PhD  [address], [address] [place], [place], [facility]  [phone] (c), [phone] (o)

## 2022-03-02 NOTE — DISCHARGE SUMMARY
Hospital Medicine Discharge Summary    Patient ID: Chandler Golden      Patient's PCP: Mone Jimenez Date: 2/28/2022     Discharge Date:   3/2/2022    Admitting Physician: Elayne Hooks DO     Discharge Physician: Jerson Peng MD     Discharge Diagnoses: Active Hospital Problems    Diagnosis Date Noted    Neck pain [M54.2] 02/28/2022       The patient was seen and examined on day of discharge and this discharge summary is in conjunction with any daily progress note from day of discharge. Hospital Course: Chandler Golden 51 y.o. female with history of multiple cervical surgeries s/p pain pump placement came into ER with a complaint of neck pain.     Patient was seen by Dr. Vincent Platt on 2/24 regarding the discrepancies in the amount of Dilaudid found in her pump pump. Physician tested the patency of the pump catheter by expressing a couple of months from catheter that include Dilaudid and CSF. Patient had multiple visits to ER for pain in cervical area. Patient was admitted for neurosurgery evaluation recommended outpatient follow-up with Dr. Jose Antonio Henderson. Patient pain regiment was adjusted with oxycodone ER 10 mg twice daily along with her scheduled Percocet. Patient seen and examined today reports that neck pain is 8-9 in intensity. Her threshold for tolerance is 8. Denies any numbness, tingling in the body. Denies any nausea, vomiting, fever, chills or discharge. Discussed with patient if her pain continues to get worse try to reach out to our pain specialist.    Final diagnosis  #Acute on chronic cervical pain on PCA pump  #History of multiple cervical surgeries. #Sinus node tachycardia. Drug report reviewed.   Patient was given a prescription for oxycodone ER 10 mg twice daily for 5 days      Physical Exam Performed:     /87   Pulse 130   Temp 97.6 °F (36.4 °C) (Oral)   Resp 16   SpO2 98%       General appearance:  No apparent distress, appears stated age and cooperative. HEENT:  Normal cephalic, atraumatic without obvious deformity. Pupils equal, round, and reactive to light. Extra ocular muscles intact. Conjunctivae/corneas clear. Neck: Supple, with full range of motion. No jugular venous distention. Trachea midline. Respiratory:  Normal respiratory effort. Clear to auscultation, bilaterally without Rales/Wheezes/Rhonchi. Cardiovascular:  Regular rate and rhythm with normal S1/S2 without murmurs, rubs or gallops. Abdomen: Soft, non-tender, non-distended with normal bowel sounds. Musculoskeletal:  No clubbing, cyanosis or edema bilaterally. Full range of motion without deformity. Skin: Skin color, texture, turgor normal.  No rashes or lesions. Neurologic:  Neurovascularly intact without any focal sensory/motor deficits. Cranial nerves: II-XII intact, grossly non-focal.  Psychiatric:  Alert and oriented, thought content appropriate, normal insight  Capillary Refill: Brisk,< 3 seconds   Peripheral Pulses: +2 palpable, equal bilaterally       Labs:  For convenience and continuity at follow-up the following most recent labs are provided:      CBC:    Lab Results   Component Value Date    WBC 7.4 03/01/2022    HGB 12.0 03/01/2022    HCT 35.4 03/01/2022     03/01/2022       Renal:    Lab Results   Component Value Date     03/01/2022    K 4.1 03/01/2022     03/01/2022    CO2 26 03/01/2022    BUN 16 03/01/2022    CREATININE 0.7 03/01/2022    CALCIUM 8.9 03/01/2022         Significant Diagnostic Studies    Radiology:   No orders to display          Consults:     IP CONSULT TO NEUROSURGERY  IP CONSULT TO HOSPITALIST    Disposition: Home    Condition at Discharge: Stable    Discharge Instructions/Follow-up: PCP, Dr. Jazz Dean, Dr. Denisse Adams Status:  Full Code     Activity: activity as tolerated    Diet: regular diet      Discharge Medications:     Current Discharge Medication List           Details   oxyCODONE (OXYCONTIN) 10 MG extended release tablet Take 1 tablet by mouth 2 times daily for 5 days. Qty: 10 tablet, Refills: 0    Comments: Reduce doses taken as pain becomes manageable  Associated Diagnoses: Pseudarthrosis after fusion or arthrodesis              Details   methylPREDNISolone (MEDROL, ELIO,) 4 MG tablet Take by mouth. Qty: 1 kit, Refills: 0      norgestrel-ethinyl estradiol (LO/OVRAL) 0.3-30 MG-MCG per tablet takse pills continuously      bisoprolol (ZEBETA) 10 MG tablet 1 PO Q AM AND 2 PO Q PM  Indications: TACHYCARDIA      cyclobenzaprine (FLEXERIL) 10 MG tablet TAKE 1 TABLET BY MOUTH 3 (THREE) TIMES DAILY AS NEEDED FOR UP TO 30 DAYS. oxyCODONE-acetaminophen (PERCOCET)  MG per tablet Take 1 tablet by mouth every 4 hours as needed. Time Spent on discharge is more than 30 minutes in the examination, evaluation, counseling and review of medications and discharge plan. Signed:    Dylan Cruz MD   3/2/2022      Thank you Leticia Hugo for the opportunity to be involved in this patient's care. If you have any questions or concerns please feel free to contact me at [phone].     This chart was likely completed using voice recognition technology and may contain unintended grammatical , phraseology,and/or punctuation errors

## 2023-08-15 ENCOUNTER — HOSPITAL ENCOUNTER (OUTPATIENT)
Dept: GENERAL RADIOLOGY | Age: 48
Discharge: HOME | End: 2023-08-15
Payer: COMMERCIAL

## 2023-08-15 ENCOUNTER — HOSPITAL ENCOUNTER (OUTPATIENT)
Dept: CT IMAGING | Age: 48
Discharge: HOME | End: 2023-08-15
Payer: COMMERCIAL

## 2023-08-15 DIAGNOSIS — M54.2 NECK PAIN: ICD-10-CM

## 2023-08-15 DIAGNOSIS — Z98.1 ARTHRODESIS STATUS: ICD-10-CM

## 2023-08-15 PROCEDURE — 2500000003 HC RX 250 WO HCPCS: Performed by: NEUROLOGICAL SURGERY

## 2023-08-15 PROCEDURE — 72240 MYELOGRAPHY NECK SPINE: CPT

## 2023-08-15 PROCEDURE — 72126 CT NECK SPINE W/DYE: CPT

## 2023-08-15 PROCEDURE — 6360000004 HC RX CONTRAST MEDICATION: Performed by: NEUROLOGICAL SURGERY

## 2023-08-15 RX ORDER — LIDOCAINE HYDROCHLORIDE 10 MG/ML
5 INJECTION, SOLUTION EPIDURAL; INFILTRATION; INTRACAUDAL; PERINEURAL ONCE
Status: COMPLETED | OUTPATIENT
Start: 2023-08-15 | End: 2023-08-15

## 2023-08-15 RX ADMIN — IOHEXOL 10 ML: 300 INJECTION, SOLUTION INTRAVENOUS at 12:21

## 2023-08-15 RX ADMIN — LIDOCAINE HYDROCHLORIDE 5 ML: 10 INJECTION, SOLUTION EPIDURAL; INFILTRATION; INTRACAUDAL at 12:20
